# Patient Record
Sex: FEMALE | Race: OTHER | Employment: STUDENT | URBAN - METROPOLITAN AREA
[De-identification: names, ages, dates, MRNs, and addresses within clinical notes are randomized per-mention and may not be internally consistent; named-entity substitution may affect disease eponyms.]

---
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
## 2022-04-12 ENCOUNTER — OFFICE VISIT (OUTPATIENT)
Dept: FAMILY MEDICINE CLINIC | Facility: CLINIC | Age: 22
End: 2022-04-12
Payer: MEDICAID

## 2022-04-12 VITALS
WEIGHT: 222 LBS | BODY MASS INDEX: 36.99 KG/M2 | DIASTOLIC BLOOD PRESSURE: 81 MMHG | SYSTOLIC BLOOD PRESSURE: 135 MMHG | HEART RATE: 76 BPM | HEIGHT: 65 IN

## 2022-04-12 DIAGNOSIS — M25.562 CHRONIC PAIN OF LEFT KNEE: Primary | ICD-10-CM

## 2022-04-12 DIAGNOSIS — Z87.828 HISTORY OF TORN MENISCUS OF LEFT KNEE: ICD-10-CM

## 2022-04-12 DIAGNOSIS — L21.9 SEBORRHEIC DERMATITIS: ICD-10-CM

## 2022-04-12 DIAGNOSIS — G89.29 CHRONIC PAIN OF LEFT KNEE: Primary | ICD-10-CM

## 2022-04-12 DIAGNOSIS — L30.9 DERMATITIS: ICD-10-CM

## 2022-04-12 DIAGNOSIS — L70.9 ACNE, UNSPECIFIED ACNE TYPE: ICD-10-CM

## 2022-04-12 PROCEDURE — 99214 OFFICE O/P EST MOD 30 MIN: CPT | Performed by: NURSE PRACTITIONER

## 2022-04-12 PROCEDURE — 3008F BODY MASS INDEX DOCD: CPT | Performed by: NURSE PRACTITIONER

## 2022-04-12 PROCEDURE — 3075F SYST BP GE 130 - 139MM HG: CPT | Performed by: NURSE PRACTITIONER

## 2022-04-12 PROCEDURE — 3079F DIAST BP 80-89 MM HG: CPT | Performed by: NURSE PRACTITIONER

## 2022-04-12 RX ORDER — KETOCONAZOLE 20 MG/ML
10 SHAMPOO TOPICAL
Qty: 120 ML | Refills: 2 | Status: SHIPPED | OUTPATIENT
Start: 2022-04-14

## 2022-06-03 DIAGNOSIS — J30.2 SEASONAL ALLERGIES: ICD-10-CM

## 2022-06-03 RX ORDER — LEVOCETIRIZINE DIHYDROCHLORIDE 5 MG/1
TABLET, FILM COATED ORAL
Qty: 90 TABLET | Refills: 1 | Status: SHIPPED | OUTPATIENT
Start: 2022-06-03

## 2022-06-23 ENCOUNTER — OFFICE VISIT (OUTPATIENT)
Dept: DERMATOLOGY CLINIC | Facility: CLINIC | Age: 22
End: 2022-06-23
Payer: MEDICAID

## 2022-06-23 DIAGNOSIS — L70.0 ACNE VULGARIS: Primary | ICD-10-CM

## 2022-06-23 DIAGNOSIS — L91.0 KELOID SCAR: ICD-10-CM

## 2022-06-23 DIAGNOSIS — L40.0 PSORIASIS VULGARIS: ICD-10-CM

## 2022-06-23 PROCEDURE — 99204 OFFICE O/P NEW MOD 45 MIN: CPT | Performed by: DERMATOLOGY

## 2022-06-23 RX ORDER — CLINDAMYCIN PHOSPHATE 10 UG/ML
1 LOTION TOPICAL 2 TIMES DAILY
Qty: 60 ML | Refills: 6 | Status: SHIPPED | OUTPATIENT
Start: 2022-06-23

## 2022-06-23 RX ORDER — TRETINOIN 0.5 MG/G
CREAM TOPICAL
Qty: 20 G | Refills: 5 | Status: SHIPPED | OUTPATIENT
Start: 2022-06-23

## 2022-06-23 RX ORDER — CLOBETASOL PROPIONATE 0.5 MG/G
1 CREAM TOPICAL 2 TIMES DAILY
Qty: 60 G | Refills: 1 | Status: SHIPPED | OUTPATIENT
Start: 2022-06-23 | End: 2023-06-23

## 2022-07-28 ENCOUNTER — NURSE TRIAGE (OUTPATIENT)
Dept: FAMILY MEDICINE CLINIC | Facility: CLINIC | Age: 22
End: 2022-07-28

## 2022-08-02 ENCOUNTER — LAB ENCOUNTER (OUTPATIENT)
Dept: LAB | Age: 22
End: 2022-08-02
Attending: NURSE PRACTITIONER
Payer: MEDICAID

## 2022-08-02 ENCOUNTER — OFFICE VISIT (OUTPATIENT)
Dept: FAMILY MEDICINE CLINIC | Facility: CLINIC | Age: 22
End: 2022-08-02
Payer: MEDICAID

## 2022-08-02 VITALS
HEART RATE: 85 BPM | BODY MASS INDEX: 36.82 KG/M2 | WEIGHT: 221 LBS | DIASTOLIC BLOOD PRESSURE: 83 MMHG | HEIGHT: 65 IN | SYSTOLIC BLOOD PRESSURE: 135 MMHG

## 2022-08-02 DIAGNOSIS — Z83.3 FAMILY HISTORY OF DIABETES MELLITUS: ICD-10-CM

## 2022-08-02 DIAGNOSIS — Z83.3 FAMILY HISTORY OF DIABETES MELLITUS: Primary | ICD-10-CM

## 2022-08-02 LAB
BASOPHILS # BLD AUTO: 0.06 X10(3) UL (ref 0–0.2)
BASOPHILS NFR BLD AUTO: 0.6 %
DEPRECATED RDW RBC AUTO: 39.8 FL (ref 35.1–46.3)
EOSINOPHIL # BLD AUTO: 0.3 X10(3) UL (ref 0–0.7)
EOSINOPHIL NFR BLD AUTO: 3 %
ERYTHROCYTE [DISTWIDTH] IN BLOOD BY AUTOMATED COUNT: 13.2 % (ref 11–15)
EST. AVERAGE GLUCOSE BLD GHB EST-MCNC: 114 MG/DL (ref 68–126)
HBA1C MFR BLD: 5.6 % (ref ?–5.7)
HCT VFR BLD AUTO: 41.9 %
HGB BLD-MCNC: 13.2 G/DL
IMM GRANULOCYTES # BLD AUTO: 0.04 X10(3) UL (ref 0–1)
IMM GRANULOCYTES NFR BLD: 0.4 %
LYMPHOCYTES # BLD AUTO: 3.01 X10(3) UL (ref 1.5–5)
LYMPHOCYTES NFR BLD AUTO: 30.3 %
MCH RBC QN AUTO: 25.8 PG (ref 26–34)
MCHC RBC AUTO-ENTMCNC: 31.5 G/DL (ref 31–37)
MCV RBC AUTO: 81.8 FL
MONOCYTES # BLD AUTO: 0.85 X10(3) UL (ref 0.1–1)
MONOCYTES NFR BLD AUTO: 8.6 %
NEUTROPHILS # BLD AUTO: 5.67 X10 (3) UL (ref 1.5–7.7)
NEUTROPHILS # BLD AUTO: 5.67 X10(3) UL (ref 1.5–7.7)
NEUTROPHILS NFR BLD AUTO: 57.1 %
PLATELET # BLD AUTO: 446 10(3)UL (ref 150–450)
RBC # BLD AUTO: 5.12 X10(6)UL
WBC # BLD AUTO: 9.9 X10(3) UL (ref 4–11)

## 2022-08-02 PROCEDURE — 3008F BODY MASS INDEX DOCD: CPT | Performed by: NURSE PRACTITIONER

## 2022-08-02 PROCEDURE — 36415 COLL VENOUS BLD VENIPUNCTURE: CPT

## 2022-08-02 PROCEDURE — 85025 COMPLETE CBC W/AUTO DIFF WBC: CPT

## 2022-08-02 PROCEDURE — 83036 HEMOGLOBIN GLYCOSYLATED A1C: CPT

## 2022-08-02 PROCEDURE — 3079F DIAST BP 80-89 MM HG: CPT | Performed by: NURSE PRACTITIONER

## 2022-08-02 PROCEDURE — 99213 OFFICE O/P EST LOW 20 MIN: CPT | Performed by: NURSE PRACTITIONER

## 2022-08-02 PROCEDURE — 3075F SYST BP GE 130 - 139MM HG: CPT | Performed by: NURSE PRACTITIONER

## 2022-08-18 ENCOUNTER — OFFICE VISIT (OUTPATIENT)
Dept: DERMATOLOGY CLINIC | Facility: CLINIC | Age: 22
End: 2022-08-18
Payer: MEDICAID

## 2022-08-18 DIAGNOSIS — L91.0 KELOID SCAR: ICD-10-CM

## 2022-08-18 DIAGNOSIS — L40.0 PSORIASIS VULGARIS: ICD-10-CM

## 2022-08-18 DIAGNOSIS — L70.0 ACNE VULGARIS: Primary | ICD-10-CM

## 2022-08-18 PROCEDURE — 99213 OFFICE O/P EST LOW 20 MIN: CPT | Performed by: DERMATOLOGY

## 2022-08-18 RX ORDER — TRETINOIN 1 MG/G
1 CREAM TOPICAL NIGHTLY
Qty: 45 G | Refills: 3 | Status: SHIPPED | OUTPATIENT
Start: 2022-08-18 | End: 2022-09-17

## 2022-12-01 ENCOUNTER — OFFICE VISIT (OUTPATIENT)
Dept: DERMATOLOGY CLINIC | Facility: CLINIC | Age: 22
End: 2022-12-01
Payer: MEDICAID

## 2022-12-01 DIAGNOSIS — L40.0 PSORIASIS VULGARIS: ICD-10-CM

## 2022-12-01 DIAGNOSIS — L70.0 ACNE VULGARIS: Primary | ICD-10-CM

## 2022-12-01 DIAGNOSIS — L91.0 KELOID SCAR: ICD-10-CM

## 2022-12-01 PROCEDURE — 99213 OFFICE O/P EST LOW 20 MIN: CPT | Performed by: DERMATOLOGY

## 2022-12-01 RX ORDER — MOMETASONE FUROATE 1 MG/ML
SOLUTION TOPICAL
Qty: 60 ML | Refills: 3 | Status: SHIPPED | OUTPATIENT
Start: 2022-12-01

## 2023-04-06 ENCOUNTER — OFFICE VISIT (OUTPATIENT)
Dept: DERMATOLOGY CLINIC | Facility: CLINIC | Age: 23
End: 2023-04-06

## 2023-04-06 DIAGNOSIS — L70.0 ACNE VULGARIS: Primary | ICD-10-CM

## 2023-04-06 DIAGNOSIS — L91.0 KELOID SCAR: ICD-10-CM

## 2023-04-06 DIAGNOSIS — L40.0 PSORIASIS VULGARIS: ICD-10-CM

## 2023-04-06 DIAGNOSIS — L50.3 DERMATOGRAPHISM: ICD-10-CM

## 2023-04-06 PROCEDURE — 99214 OFFICE O/P EST MOD 30 MIN: CPT | Performed by: DERMATOLOGY

## 2023-04-06 RX ORDER — TRETINOIN 0.5 MG/G
CREAM TOPICAL
Qty: 20 G | Refills: 6 | Status: SHIPPED | OUTPATIENT
Start: 2023-04-06

## 2023-04-06 RX ORDER — MOMETASONE FUROATE 1 MG/ML
SOLUTION TOPICAL
Qty: 60 ML | Refills: 6 | Status: SHIPPED | OUTPATIENT
Start: 2023-04-06

## 2023-07-06 ENCOUNTER — LAB ENCOUNTER (OUTPATIENT)
Dept: LAB | Age: 23
End: 2023-07-06
Attending: DERMATOLOGY
Payer: MEDICAID

## 2023-07-06 ENCOUNTER — OFFICE VISIT (OUTPATIENT)
Dept: DERMATOLOGY CLINIC | Facility: CLINIC | Age: 23
End: 2023-07-06

## 2023-07-06 DIAGNOSIS — L40.0 PSORIASIS VULGARIS: ICD-10-CM

## 2023-07-06 DIAGNOSIS — L70.0 ACNE VULGARIS: ICD-10-CM

## 2023-07-06 DIAGNOSIS — N92.6 ABNORMAL MENSES: ICD-10-CM

## 2023-07-06 DIAGNOSIS — L91.0 KELOID SCAR: ICD-10-CM

## 2023-07-06 DIAGNOSIS — L70.0 ACNE VULGARIS: Primary | ICD-10-CM

## 2023-07-06 DIAGNOSIS — L50.3 DERMATOGRAPHISM: ICD-10-CM

## 2023-07-06 LAB
ALBUMIN SERPL-MCNC: 3.8 G/DL (ref 3.4–5)
ALBUMIN/GLOB SERPL: 1 {RATIO} (ref 1–2)
ALP LIVER SERPL-CCNC: 85 U/L
ALT SERPL-CCNC: 29 U/L
ANION GAP SERPL CALC-SCNC: 6 MMOL/L (ref 0–18)
AST SERPL-CCNC: 13 U/L (ref 15–37)
BILIRUB SERPL-MCNC: 0.2 MG/DL (ref 0.1–2)
BUN BLD-MCNC: 12 MG/DL (ref 7–18)
CALCIUM BLD-MCNC: 9.5 MG/DL (ref 8.5–10.1)
CHLORIDE SERPL-SCNC: 105 MMOL/L (ref 98–112)
CO2 SERPL-SCNC: 27 MMOL/L (ref 21–32)
CREAT BLD-MCNC: 0.76 MG/DL
DHEA-S SERPL-MCNC: 367.7 UG/DL
EST. AVERAGE GLUCOSE BLD GHB EST-MCNC: 123 MG/DL (ref 68–126)
FASTING STATUS PATIENT QL REPORTED: NO
GFR SERPLBLD BASED ON 1.73 SQ M-ARVRAT: 115 ML/MIN/1.73M2 (ref 60–?)
GLOBULIN PLAS-MCNC: 3.9 G/DL (ref 2.8–4.4)
GLUCOSE BLD-MCNC: 94 MG/DL (ref 70–99)
HBA1C MFR BLD: 5.9 % (ref ?–5.7)
INSULIN SERPL-ACNC: 55 MU/L (ref 3–25)
OSMOLALITY SERPL CALC.SUM OF ELEC: 286 MOSM/KG (ref 275–295)
POTASSIUM SERPL-SCNC: 3.7 MMOL/L (ref 3.5–5.1)
PROLACTIN SERPL-MCNC: 8.3 NG/ML
PROT SERPL-MCNC: 7.7 G/DL (ref 6.4–8.2)
SODIUM SERPL-SCNC: 138 MMOL/L (ref 136–145)
TSI SER-ACNC: 1.34 MIU/ML (ref 0.36–3.74)

## 2023-07-06 PROCEDURE — 83036 HEMOGLOBIN GLYCOSYLATED A1C: CPT

## 2023-07-06 PROCEDURE — 84443 ASSAY THYROID STIM HORMONE: CPT

## 2023-07-06 PROCEDURE — 36415 COLL VENOUS BLD VENIPUNCTURE: CPT

## 2023-07-06 PROCEDURE — 80053 COMPREHEN METABOLIC PANEL: CPT

## 2023-07-06 PROCEDURE — 82627 DEHYDROEPIANDROSTERONE: CPT

## 2023-07-06 PROCEDURE — 83525 ASSAY OF INSULIN: CPT

## 2023-07-06 PROCEDURE — 84146 ASSAY OF PROLACTIN: CPT

## 2023-07-06 PROCEDURE — 84410 TESTOSTERONE BIOAVAILABLE: CPT

## 2023-07-06 PROCEDURE — 99214 OFFICE O/P EST MOD 30 MIN: CPT | Performed by: DERMATOLOGY

## 2023-07-06 RX ORDER — MINOCYCLINE HYDROCHLORIDE 50 MG/1
50 CAPSULE ORAL DAILY
Qty: 30 CAPSULE | Refills: 3 | Status: SHIPPED | OUTPATIENT
Start: 2023-07-06

## 2023-07-10 ENCOUNTER — TELEPHONE (OUTPATIENT)
Dept: DERMATOLOGY CLINIC | Facility: CLINIC | Age: 23
End: 2023-07-10

## 2023-07-10 NOTE — PROGRESS NOTES
Pt informed of lab results and recommendations and became tearful. Pt states she has a f/u appt with PCP soon and has an appt in 2 months with Jack Son. Await testosterone results.

## 2023-07-12 LAB
SEX HORM BIND GLOB: 26.5 NMOL/L
TESTOST % FREE+WEAK BND: 29.2 %
TESTOST FREE+WEAK BND: 10.9 NG/DL
TESTOSTERONE TOT /MS: 37.4 NG/DL

## 2023-07-16 NOTE — PROGRESS NOTES
Pasquale Lua is a 23year old female. Patient presents with:  Acne: LOV 4/2023. Pt presents for acne f/u. RX Tretinoin 0.05 % External Cream and clindamycin 1 % External Lotion. Pt feels overall working okay. Has decreased her tretinoin r/t tanning. Still having flares to the left side jawline. Psoriasis: Rx Mometasone Furoate 0.1 % External Solution, ketoconazole 2 % External Shampoo, and clobetasol. Pt feels overall working well. Patient has no known allergies. Current Outpatient Medications   Medication Sig Dispense Refill    metFORMIN 500 MG Oral Tab Take 1 tablet (500 mg total) by mouth 2 (two) times daily with meals. 60 tablet 3    minocycline 50 MG Oral Cap Take 1 capsule (50 mg total) by mouth daily. 30 capsule 3    buPROPion  MG Oral Tablet 24 Hr Take 1 tablet (150 mg total) by mouth daily. 30 tablet 1    Tretinoin 0.05 % External Cream Apply a small pea sized amount to areas of acne on the face as directed at bedtime. Use over serums, clindamycin and moistruizers 20 g 6    Mometasone Furoate 0.1 % External Solution Use bid to scalp 60 mL 6    clindamycin 1 % External Lotion Apply 1 Application topically 2 (two) times daily. Apply thin film to affected area(s). For acne 60 mL 6    LEVOCETIRIZINE 5 MG Oral Tab TAKE 1 TABLET(5 MG) BY MOUTH EVERY EVENING 90 tablet 1    ketoconazole 2 % External Shampoo Apply 10 mL topically twice a week. 120 mL 2      History reviewed. No pertinent past medical history.    Social History:  Social History     Socioeconomic History    Marital status: Single   Tobacco Use    Smoking status: Never    Smokeless tobacco: Never   Vaping Use    Vaping Use: Never used   Substance and Sexual Activity    Alcohol use: Not Currently    Drug use: Never    Sexual activity: Never     Birth control/protection: Pill, OCP   Other Topics Concern    Grew up on a farm No    History of tanning Yes    Outdoor occupation No    Breast feeding No    Reaction to local anesthetic No    Pt has a pacemaker No    Pt has a defibrillator No                 Current Outpatient Medications   Medication Sig Dispense Refill    metFORMIN 500 MG Oral Tab Take 1 tablet (500 mg total) by mouth 2 (two) times daily with meals. 60 tablet 3    minocycline 50 MG Oral Cap Take 1 capsule (50 mg total) by mouth daily. 30 capsule 3    buPROPion  MG Oral Tablet 24 Hr Take 1 tablet (150 mg total) by mouth daily. 30 tablet 1    Tretinoin 0.05 % External Cream Apply a small pea sized amount to areas of acne on the face as directed at bedtime. Use over serums, clindamycin and moistruizers 20 g 6    Mometasone Furoate 0.1 % External Solution Use bid to scalp 60 mL 6    clindamycin 1 % External Lotion Apply 1 Application topically 2 (two) times daily. Apply thin film to affected area(s). For acne 60 mL 6    LEVOCETIRIZINE 5 MG Oral Tab TAKE 1 TABLET(5 MG) BY MOUTH EVERY EVENING 90 tablet 1    ketoconazole 2 % External Shampoo Apply 10 mL topically twice a week. 120 mL 2     Allergies:   No Known Allergies    History reviewed. No pertinent past medical history. History reviewed. No pertinent surgical history.   Social History    Socioeconomic History      Marital status: Single      Spouse name: Not on file      Number of children: Not on file      Years of education: Not on file      Highest education level: Not on file    Occupational History      Not on file    Tobacco Use      Smoking status: Never      Smokeless tobacco: Never    Vaping Use      Vaping Use: Never used    Substance and Sexual Activity      Alcohol use: Not Currently      Drug use: Never      Sexual activity: Never        Birth control/protection: Pill, OCP    Other Topics      Concerns:        Grew up on a farm: No        History of tanning: Yes        Outdoor occupation: No        Breast feeding: No        Reaction to local anesthetic: No        Pt has a pacemaker: No        Pt has a defibrillator: No    Social History Narrative Not on file    Social Determinants of Health  Financial Resource Strain: Not on file  Food Insecurity: Not on file  Transportation Needs: Not on file  Physical Activity: Not on file  Stress: Not on file  Social Connections: Not on file  Housing Stability: Not on file  Family History   Problem Relation Age of Onset    Diabetes Mother     Cancer Maternal Grandmother         ovarian                      HPI :      Patient presents with:  Acne: LOV 4/2023. Pt presents for acne f/u. RX Tretinoin 0.05 % External Cream and clindamycin 1 % External Lotion. Pt feels overall working okay. Has decreased her tretinoin r/t tanning. Still having flares to the left side jawline. Psoriasis: Rx Mometasone Furoate 0.1 % External Solution, ketoconazole 2 % External Shampoo, and clobetasol. Pt feels overall working well. Follow-up acne flaring on face especially left more nodules frustrated, topicals help not controlled completely    Psoriasis better  Keloid stable improving    Itching stable  Patient presents with concerns above. Past notes/ records and appropriate/relevant lab results including pathology and past body maps reviewed. Updated and new information noted in current visit. ROS:    Denies any other systemic complaints. No fevers, chills, night sweats, sensitivity to the sun, deeper lumps or bumps. No other skin complaints. History, medications, allergies as noted. Physical examination: Patient  well-developed well-nourished, alert oriented in no acute distress. Exam of involved, appropriate areas of skin performed, including scalp, head, neck, face,nails, hair, external eyes, including conjunctival mucosa, eyelids, lips, external ears, back, chest, abdomen, arms, legs, palms. Remarkable for lesions as noted     Acne cystic nodules grade 3 chin cheeks jawline, postinflammatory changes.   Chest back shoulders okay, psoriasis stable improved to scalp fairly clear, minimal knees elbows  ASSESSMENT AND PLAN:     Acne vulgaris  (primary encounter diagnosis)  Abnormal menses    Assessment / plan:    Acne. See medications in grid. Skin care regimen discussed at length including cleansers, makeup, face washing, sunscreen. Recheck in 6 -8 weeks if no improvement. Notify us promptly if problems tolerating regimen. Consider more aggressive therapy if not responding. Patient cycles still irregular no menses since 4/23 we will check labs as noted,  Consider additional medications including spironolactone patient agreeable to starting oral medications at this time frustrated with nodules. Continue topicals currently current skin care regimen tretinoin, clindamycin    Keloid stable improving    Psoriasis fairly stable with ketoconazole shampoo, mometasone solution has been doing fairly well has clobetasol cream for elbows and knees    Itching okay continue antihistamines as needed for dermographism      Pathophysiology discussed with patient. Therapeutic options reviewed. See  Medications in grid. Instructions reviewed at length. General skin care questions answered. Reassurance regarding benign skin lesions. Signs and symptoms of skin cancer, ABCDE's of melanoma briefly reviewed. Sunscreen use, sun protection, encouraged. Followup as noted in rtc or p.r.n. Encounter Times  Including precharting, reviewing chart, prior notes obtaining history: 5 minutes, medical exam :10 minutes, notes on body map, plan, counseling 10minutes My total time spent caring for the patient on the day of the encounter: 25 minutes       The patient indicates understanding of these issues and agrees to the plan. The patient is asked to return as noted in follow-up /as noted above    This note was generated using Dragon voice recognition software. Please contact me regarding any confusion resulting from errors in recognition.     Note to patient and family: The [facility] makes medical notes like these available to patients. However, be advised this is a medical document. It is intended as hgaf-eq-rale communication and monitoring of a patient's care needs. It is written in medical language and may contain abbreviations or verbiage that are unfamiliar. It may appear blunt or direct. Medical documents are intended to carry relevant information, facts as evident and the clinical opinion of the practitioner. Orders Placed This Encounter      Dehydroepiandrosterone Sulfate      Testosterone,Total and Weakly Bound w/ SHBG      Hemoglobin A1C      TSH W Reflex To Free T4      Comp Metabolic Panel (14)      Prolactin      Insulin      Meds & Refills for this Visit:   Requested Prescriptions     Signed Prescriptions Disp Refills    minocycline 50 MG Oral Cap 30 capsule 3     Sig: Take 1 capsule (50 mg total) by mouth daily. metFORMIN 500 MG Oral Tab 60 tablet 3     Sig: Take 1 tablet (500 mg total) by mouth 2 (two) times daily with meals. Acne vulgaris  (primary encounter diagnosis)  Abnormal menses    Orders Placed This Encounter      Dehydroepiandrosterone Sulfate      Testosterone,Total and Weakly Bound w/ SHBG      Hemoglobin A1C      TSH W Reflex To Free T4      Comp Metabolic Panel (14)      Prolactin      Insulin      Results From Past 48 Hours:  No results found for this or any previous visit (from the past 48 hour(s)). Meds This Visit:      Imaging Orders:  None     Referral Orders:  No orders of the defined types were placed in this encounter.

## 2023-08-21 ENCOUNTER — OFFICE VISIT (OUTPATIENT)
Dept: FAMILY MEDICINE CLINIC | Facility: CLINIC | Age: 23
End: 2023-08-21

## 2023-08-21 VITALS
SYSTOLIC BLOOD PRESSURE: 129 MMHG | TEMPERATURE: 99 F | DIASTOLIC BLOOD PRESSURE: 84 MMHG | WEIGHT: 246 LBS | BODY MASS INDEX: 41 KG/M2 | HEART RATE: 111 BPM

## 2023-08-21 DIAGNOSIS — L21.9 SEBORRHEIC DERMATITIS: ICD-10-CM

## 2023-08-21 DIAGNOSIS — R63.2 BINGE EATING: ICD-10-CM

## 2023-08-21 DIAGNOSIS — R41.840 INATTENTION: Primary | ICD-10-CM

## 2023-08-21 DIAGNOSIS — F41.8 DEPRESSION WITH ANXIETY: ICD-10-CM

## 2023-08-21 DIAGNOSIS — L70.9 ACNE, UNSPECIFIED ACNE TYPE: ICD-10-CM

## 2023-08-21 PROCEDURE — 99214 OFFICE O/P EST MOD 30 MIN: CPT | Performed by: FAMILY MEDICINE

## 2023-08-21 PROCEDURE — 3079F DIAST BP 80-89 MM HG: CPT | Performed by: FAMILY MEDICINE

## 2023-08-21 PROCEDURE — 3074F SYST BP LT 130 MM HG: CPT | Performed by: FAMILY MEDICINE

## 2023-08-21 RX ORDER — BUPROPION HYDROCHLORIDE 150 MG/1
150 TABLET ORAL 2 TIMES DAILY
Qty: 180 TABLET | Refills: 0 | Status: SHIPPED | OUTPATIENT
Start: 2023-08-21 | End: 2023-08-23

## 2023-08-21 RX ORDER — TRETINOIN 0.5 MG/G
CREAM TOPICAL
Qty: 20 G | Refills: 6 | Status: SHIPPED | OUTPATIENT
Start: 2023-08-21

## 2023-08-21 RX ORDER — KETOCONAZOLE 20 MG/ML
10 SHAMPOO TOPICAL
Qty: 120 ML | Refills: 2 | Status: SHIPPED | OUTPATIENT
Start: 2023-08-21

## 2023-08-22 ENCOUNTER — TELEPHONE (OUTPATIENT)
Dept: FAMILY MEDICINE CLINIC | Facility: CLINIC | Age: 23
End: 2023-08-22

## 2023-08-22 DIAGNOSIS — F41.8 DEPRESSION WITH ANXIETY: ICD-10-CM

## 2023-08-22 NOTE — TELEPHONE ENCOUNTER
Vyvanse has been denied, medication requested as ADHD meds per office visit reason for visit, if this is for binge eating you may submit an appeal , see page 2

## 2023-08-23 PROBLEM — F41.8 DEPRESSION WITH ANXIETY: Status: ACTIVE | Noted: 2023-08-23

## 2023-08-23 PROBLEM — R63.2 BINGE EATING: Status: ACTIVE | Noted: 2023-08-23

## 2023-08-23 PROBLEM — L70.9 ACNE: Status: ACTIVE | Noted: 2023-08-23

## 2023-08-23 PROBLEM — R41.840 INATTENTION: Status: ACTIVE | Noted: 2023-08-23

## 2023-08-23 PROBLEM — L40.9 PSORIASIS: Status: ACTIVE | Noted: 2023-08-23

## 2023-08-23 PROBLEM — L21.9 SEBORRHEIC DERMATITIS: Status: ACTIVE | Noted: 2023-08-23

## 2023-08-23 RX ORDER — BUPROPION HYDROCHLORIDE 300 MG/1
300 TABLET ORAL 2 TIMES DAILY
Qty: 90 TABLET | Refills: 1 | Status: SHIPPED | OUTPATIENT
Start: 2023-08-23

## 2023-08-23 RX ORDER — LISDEXAMFETAMINE DIMESYLATE 10 MG/1
10 CAPSULE ORAL DAILY
Qty: 30 CAPSULE | Refills: 0 | Status: SHIPPED | OUTPATIENT
Start: 2023-08-23 | End: 2023-09-22

## 2023-08-23 NOTE — TELEPHONE ENCOUNTER
Sending Vyvanse at a lower dose and linked only with binge eating diagnosis. She does not have any documented ADD/ADHD.   Thank you

## 2023-08-24 NOTE — TELEPHONE ENCOUNTER
Approved    Prior authorization approved   Case ID: [id number]      Payer: [address] [place] [address] Road    [phone]   The case has been Approved from  [id number]   Approval Details    Authorized from May 25, 2023 to August 23, 2024      Electronic appeal: Not supported   View History

## 2023-09-07 ENCOUNTER — OFFICE VISIT (OUTPATIENT)
Dept: DERMATOLOGY CLINIC | Facility: CLINIC | Age: 23
End: 2023-09-07

## 2023-09-07 DIAGNOSIS — N92.6 ABNORMAL MENSES: ICD-10-CM

## 2023-09-07 DIAGNOSIS — L40.0 PSORIASIS VULGARIS: ICD-10-CM

## 2023-09-07 DIAGNOSIS — L50.3 DERMATOGRAPHISM: ICD-10-CM

## 2023-09-07 DIAGNOSIS — L91.0 KELOID SCAR: ICD-10-CM

## 2023-09-07 DIAGNOSIS — L70.0 ACNE VULGARIS: Primary | ICD-10-CM

## 2023-09-07 PROCEDURE — 99214 OFFICE O/P EST MOD 30 MIN: CPT | Performed by: DERMATOLOGY

## 2023-09-07 RX ORDER — HYDROCORTISONE 25 MG/ML
LOTION TOPICAL
Qty: 120 ML | Refills: 5 | Status: SHIPPED | OUTPATIENT
Start: 2023-09-07

## 2023-09-07 RX ORDER — SPIRONOLACTONE 50 MG/1
100 TABLET, FILM COATED ORAL DAILY
Qty: 60 TABLET | Refills: 3 | Status: SHIPPED | OUTPATIENT
Start: 2023-09-07

## 2023-10-02 ENCOUNTER — OFFICE VISIT (OUTPATIENT)
Dept: FAMILY MEDICINE CLINIC | Facility: CLINIC | Age: 23
End: 2023-10-02

## 2023-10-02 VITALS
BODY MASS INDEX: 39 KG/M2 | SYSTOLIC BLOOD PRESSURE: 124 MMHG | WEIGHT: 237 LBS | HEART RATE: 74 BPM | DIASTOLIC BLOOD PRESSURE: 87 MMHG | TEMPERATURE: 98 F

## 2023-10-02 DIAGNOSIS — L70.9 ACNE, UNSPECIFIED ACNE TYPE: Primary | ICD-10-CM

## 2023-10-02 DIAGNOSIS — F41.8 DEPRESSION WITH ANXIETY: ICD-10-CM

## 2023-10-02 DIAGNOSIS — R63.2 BINGE EATING: ICD-10-CM

## 2023-10-02 PROCEDURE — 3079F DIAST BP 80-89 MM HG: CPT | Performed by: FAMILY MEDICINE

## 2023-10-02 PROCEDURE — 3074F SYST BP LT 130 MM HG: CPT | Performed by: FAMILY MEDICINE

## 2023-10-02 PROCEDURE — 99214 OFFICE O/P EST MOD 30 MIN: CPT | Performed by: FAMILY MEDICINE

## 2023-10-02 RX ORDER — BUPROPION HYDROCHLORIDE 300 MG/1
300 TABLET ORAL 2 TIMES DAILY
Qty: 90 TABLET | Refills: 1 | Status: SHIPPED | OUTPATIENT
Start: 2023-10-02

## 2023-10-02 RX ORDER — LISDEXAMFETAMINE DIMESYLATE CAPSULES 30 MG/1
30 CAPSULE ORAL DAILY
Qty: 30 CAPSULE | Refills: 0 | Status: SHIPPED | OUTPATIENT
Start: 2023-11-02 | End: 2023-12-02

## 2023-10-02 RX ORDER — NORETHINDRONE ACETATE AND ETHINYL ESTRADIOL 1MG-20(21)
1 KIT ORAL DAILY
COMMUNITY

## 2023-10-02 RX ORDER — LISDEXAMFETAMINE DIMESYLATE CAPSULES 10 MG/1
10 CAPSULE ORAL EVERY MORNING
COMMUNITY

## 2023-10-02 RX ORDER — CLINDAMYCIN PHOSPHATE 10 UG/ML
1 LOTION TOPICAL 2 TIMES DAILY
Qty: 60 ML | Refills: 6 | Status: SHIPPED | OUTPATIENT
Start: 2023-10-02

## 2023-10-02 RX ORDER — LISDEXAMFETAMINE DIMESYLATE CAPSULES 30 MG/1
30 CAPSULE ORAL DAILY
Qty: 30 CAPSULE | Refills: 0 | Status: SHIPPED | OUTPATIENT
Start: 2023-10-02 | End: 2023-11-01

## 2023-10-02 RX ORDER — LISDEXAMFETAMINE DIMESYLATE CAPSULES 30 MG/1
30 CAPSULE ORAL DAILY
Qty: 30 CAPSULE | Refills: 0 | Status: SHIPPED | OUTPATIENT
Start: 2023-12-03 | End: 2024-01-02

## 2023-10-03 ENCOUNTER — TELEPHONE (OUTPATIENT)
Dept: FAMILY MEDICINE CLINIC | Facility: CLINIC | Age: 23
End: 2023-10-03

## 2023-10-12 ENCOUNTER — TELEPHONE (OUTPATIENT)
Dept: FAMILY MEDICINE CLINIC | Facility: CLINIC | Age: 23
End: 2023-10-12

## 2023-10-12 DIAGNOSIS — F41.8 DEPRESSION WITH ANXIETY: ICD-10-CM

## 2023-10-18 ENCOUNTER — OFFICE VISIT (OUTPATIENT)
Dept: ENDOCRINOLOGY CLINIC | Facility: CLINIC | Age: 23
End: 2023-10-18

## 2023-10-18 VITALS
SYSTOLIC BLOOD PRESSURE: 147 MMHG | BODY MASS INDEX: 39.82 KG/M2 | DIASTOLIC BLOOD PRESSURE: 94 MMHG | HEIGHT: 65 IN | HEART RATE: 98 BPM | WEIGHT: 239 LBS

## 2023-10-18 DIAGNOSIS — E28.2 PCOS (POLYCYSTIC OVARIAN SYNDROME): ICD-10-CM

## 2023-10-18 DIAGNOSIS — R73.03 PRE-DIABETES: ICD-10-CM

## 2023-10-18 DIAGNOSIS — E88.819 INSULIN RESISTANCE: Primary | ICD-10-CM

## 2023-10-18 PROCEDURE — 3077F SYST BP >= 140 MM HG: CPT | Performed by: INTERNAL MEDICINE

## 2023-10-18 PROCEDURE — 99244 OFF/OP CNSLTJ NEW/EST MOD 40: CPT | Performed by: INTERNAL MEDICINE

## 2023-10-18 PROCEDURE — 3080F DIAST BP >= 90 MM HG: CPT | Performed by: INTERNAL MEDICINE

## 2023-10-18 PROCEDURE — 3008F BODY MASS INDEX DOCD: CPT | Performed by: INTERNAL MEDICINE

## 2023-10-18 RX ORDER — BUPROPION HYDROCHLORIDE 300 MG/1
300 TABLET ORAL 2 TIMES DAILY
Qty: 60 TABLET | Refills: 5 | Status: SHIPPED | OUTPATIENT
Start: 2023-10-18 | End: 2023-11-14

## 2023-10-18 NOTE — TELEPHONE ENCOUNTER
1. Depression with anxiety  \"  Rx benefits calling to verify QTY, advised 60 per 30 days    \"  - buPROPion  MG Oral Tablet 24 Hr; Take 1 tablet (300 mg total) by mouth in the morning and 1 tablet (300 mg total) before bedtime.  Dispense: 60 tablet; Refill: 5

## 2023-10-25 ENCOUNTER — LAB ENCOUNTER (OUTPATIENT)
Dept: LAB | Age: 23
End: 2023-10-25
Attending: INTERNAL MEDICINE

## 2023-10-25 DIAGNOSIS — R73.03 PRE-DIABETES: ICD-10-CM

## 2023-10-25 DIAGNOSIS — E88.819 INSULIN RESISTANCE: ICD-10-CM

## 2023-10-25 DIAGNOSIS — E28.2 PCOS (POLYCYSTIC OVARIAN SYNDROME): ICD-10-CM

## 2023-10-25 LAB
ALBUMIN SERPL-MCNC: 3.4 G/DL (ref 3.4–5)
ALBUMIN/GLOB SERPL: 0.8 {RATIO} (ref 1–2)
ALP LIVER SERPL-CCNC: 84 U/L
ALT SERPL-CCNC: 31 U/L
ANION GAP SERPL CALC-SCNC: 8 MMOL/L (ref 0–18)
AST SERPL-CCNC: 9 U/L (ref 15–37)
BILIRUB SERPL-MCNC: 0.3 MG/DL (ref 0.1–2)
BUN BLD-MCNC: 9 MG/DL (ref 7–18)
BUN/CREAT SERPL: 10.2 (ref 10–20)
CALCIUM BLD-MCNC: 9.4 MG/DL (ref 8.5–10.1)
CHLORIDE SERPL-SCNC: 106 MMOL/L (ref 98–112)
CHOLEST SERPL-MCNC: 198 MG/DL (ref ?–200)
CO2 SERPL-SCNC: 24 MMOL/L (ref 21–32)
CREAT BLD-MCNC: 0.88 MG/DL
EGFRCR SERPLBLD CKD-EPI 2021: 96 ML/MIN/1.73M2 (ref 60–?)
EST. AVERAGE GLUCOSE BLD GHB EST-MCNC: 114 MG/DL (ref 68–126)
FASTING PATIENT LIPID ANSWER: YES
FASTING STATUS PATIENT QL REPORTED: YES
GLOBULIN PLAS-MCNC: 4.5 G/DL (ref 2.8–4.4)
GLUCOSE BLD-MCNC: 114 MG/DL (ref 70–99)
HBA1C MFR BLD: 5.6 % (ref ?–5.7)
HDLC SERPL-MCNC: 85 MG/DL (ref 40–59)
INSULIN SERPL-ACNC: 39.3 MU/L (ref 3–25)
LDLC SERPL CALC-MCNC: 81 MG/DL (ref ?–100)
NONHDLC SERPL-MCNC: 113 MG/DL (ref ?–130)
OSMOLALITY SERPL CALC.SUM OF ELEC: 286 MOSM/KG (ref 275–295)
POTASSIUM SERPL-SCNC: 4.3 MMOL/L (ref 3.5–5.1)
PROT SERPL-MCNC: 7.9 G/DL (ref 6.4–8.2)
SODIUM SERPL-SCNC: 138 MMOL/L (ref 136–145)
TRIGL SERPL-MCNC: 194 MG/DL (ref 30–149)
VLDLC SERPL CALC-MCNC: 31 MG/DL (ref 0–30)

## 2023-10-25 PROCEDURE — 36415 COLL VENOUS BLD VENIPUNCTURE: CPT

## 2023-10-25 PROCEDURE — 83525 ASSAY OF INSULIN: CPT

## 2023-10-25 PROCEDURE — 80053 COMPREHEN METABOLIC PANEL: CPT

## 2023-10-25 PROCEDURE — 83036 HEMOGLOBIN GLYCOSYLATED A1C: CPT

## 2023-10-25 PROCEDURE — 80061 LIPID PANEL: CPT

## 2023-11-03 RX ORDER — MINOCYCLINE HYDROCHLORIDE 50 MG/1
50 CAPSULE ORAL DAILY
Qty: 30 CAPSULE | Refills: 3 | Status: SHIPPED | OUTPATIENT
Start: 2023-11-03

## 2023-11-03 NOTE — TELEPHONE ENCOUNTER
Refill Request for medication(s): minocycline 50 MG Oral Cap     Last Office Visit: 9/2023    Last Refill: 9/2023    Pharmacy, Dosage verified:     Condition Update (if applicable):     Rx pended and sent to provider for approval, please advise. Thank You!

## 2023-11-13 ENCOUNTER — TELEPHONE (OUTPATIENT)
Dept: INTERNAL MEDICINE CLINIC | Facility: CLINIC | Age: 23
End: 2023-11-13

## 2023-11-13 DIAGNOSIS — F41.8 DEPRESSION WITH ANXIETY: ICD-10-CM

## 2023-11-13 NOTE — TELEPHONE ENCOUNTER
Prior Authorization    Bupropion XL 300MG TABLETS     Plan does not cover this medication. Please call plan at [phone] to initiate prior authorization or call/fax pharmacy to change medication.  Patient ID is [id number]

## 2023-11-13 NOTE — TELEPHONE ENCOUNTER
Denied   10/12/2023  5:00 PM  Case ID: [id number] Appeal supported: No   Note from payer: Details of this decision are provided on the physician outcome notice which has been faxed to the number on file. Payer: [address] [place] [address] Road    [phone]     See encounter 10/12/23    Alcario Comas is 30/30 per denial letter   The dose must be at or below the highest dose that is noted by the FDA. Please advise the next steps.

## 2023-11-14 ENCOUNTER — OFFICE VISIT (OUTPATIENT)
Dept: ENDOCRINOLOGY CLINIC | Facility: CLINIC | Age: 23
End: 2023-11-14

## 2023-11-14 VITALS
BODY MASS INDEX: 39.82 KG/M2 | DIASTOLIC BLOOD PRESSURE: 84 MMHG | HEIGHT: 65 IN | WEIGHT: 239 LBS | SYSTOLIC BLOOD PRESSURE: 139 MMHG | HEART RATE: 99 BPM

## 2023-11-14 DIAGNOSIS — R73.03 PRE-DIABETES: ICD-10-CM

## 2023-11-14 DIAGNOSIS — E88.819 INSULIN RESISTANCE: Primary | ICD-10-CM

## 2023-11-14 DIAGNOSIS — E28.2 PCOS (POLYCYSTIC OVARIAN SYNDROME): ICD-10-CM

## 2023-11-14 PROCEDURE — 99214 OFFICE O/P EST MOD 30 MIN: CPT | Performed by: INTERNAL MEDICINE

## 2023-11-14 PROCEDURE — 3079F DIAST BP 80-89 MM HG: CPT | Performed by: INTERNAL MEDICINE

## 2023-11-14 PROCEDURE — 3075F SYST BP GE 130 - 139MM HG: CPT | Performed by: INTERNAL MEDICINE

## 2023-11-14 PROCEDURE — 3008F BODY MASS INDEX DOCD: CPT | Performed by: INTERNAL MEDICINE

## 2023-11-14 RX ORDER — BUPROPION HYDROCHLORIDE 300 MG/1
300 TABLET ORAL DAILY
Qty: 30 TABLET | Refills: 3 | Status: SHIPPED | OUTPATIENT
Start: 2023-11-14 | End: 2023-12-14

## 2023-11-14 NOTE — TELEPHONE ENCOUNTER
1. Depression with anxiety  Sending 30/30 if that's going to get covered. - buPROPion  MG Oral Tablet 24 Hr; Take 1 tablet (300 mg total) by mouth daily. Dispense: 30 tablet;  Refill: 3

## 2023-11-15 ENCOUNTER — TELEPHONE (OUTPATIENT)
Dept: INTERNAL MEDICINE CLINIC | Facility: CLINIC | Age: 23
End: 2023-11-15

## 2023-11-15 NOTE — TELEPHONE ENCOUNTER
Patient asking if she can skip dose of Bupropion 300 mg. Patient will be celebrating 23st birthday. Please advise.

## 2023-11-15 NOTE — TELEPHONE ENCOUNTER
Patient asking if she will be able to drink alcohol while taking rx Bupropion, patient will be turning 23. Patient asking if she would be able to skip a dose, please update through jaswinder, atilio.

## 2023-11-19 NOTE — TELEPHONE ENCOUNTER
Wellbutrin will still be in her system. Wouldn't skip just wouldn't drink more than 1-2 servings if she has to on this one day.  Absolutely no binge drinking

## 2023-11-20 NOTE — TELEPHONE ENCOUNTER
Spoke with patient (name/ confirmed) and informed patient of Dr. Alexia Merlos instructions/recommendations. Patient verbalizes understanding and agrees to the plan of care.   Patient had no further questions at this time    Patient states did also ask the pharmacist about side effects    Confirmed with patient that Dr. Yasmani Lambert is her PCP   Order to change entered

## 2023-11-21 ENCOUNTER — PATIENT OUTREACH (OUTPATIENT)
Dept: CASE MANAGEMENT | Age: 23
End: 2023-11-21

## 2023-11-21 NOTE — PROCEDURES
Received order requesting to update PCP to Dr. Varun Penaloza is Approved and finalized on November 21, 2023.     Thanks,  [facility]

## 2023-11-27 ENCOUNTER — OFFICE VISIT (OUTPATIENT)
Dept: SURGERY | Facility: CLINIC | Age: 23
End: 2023-11-27
Payer: MEDICAID

## 2023-11-27 VITALS
OXYGEN SATURATION: 99 % | BODY MASS INDEX: 39.95 KG/M2 | HEIGHT: 64.2 IN | WEIGHT: 234 LBS | DIASTOLIC BLOOD PRESSURE: 80 MMHG | HEART RATE: 66 BPM | SYSTOLIC BLOOD PRESSURE: 126 MMHG

## 2023-11-27 DIAGNOSIS — E66.9 OBESITY (BMI 30-39.9): ICD-10-CM

## 2023-11-27 DIAGNOSIS — F41.8 DEPRESSION WITH ANXIETY: ICD-10-CM

## 2023-11-27 DIAGNOSIS — E78.5 DYSLIPIDEMIA: Primary | ICD-10-CM

## 2023-11-27 DIAGNOSIS — E28.2 PCOS (POLYCYSTIC OVARIAN SYNDROME): ICD-10-CM

## 2023-11-27 DIAGNOSIS — E88.819 INSULIN RESISTANCE: ICD-10-CM

## 2023-11-27 DIAGNOSIS — R63.2 BINGE EATING: ICD-10-CM

## 2023-11-27 PROCEDURE — 3079F DIAST BP 80-89 MM HG: CPT | Performed by: NURSE PRACTITIONER

## 2023-11-27 PROCEDURE — 3008F BODY MASS INDEX DOCD: CPT | Performed by: NURSE PRACTITIONER

## 2023-11-27 PROCEDURE — 3074F SYST BP LT 130 MM HG: CPT | Performed by: NURSE PRACTITIONER

## 2023-11-27 PROCEDURE — 99204 OFFICE O/P NEW MOD 45 MIN: CPT | Performed by: NURSE PRACTITIONER

## 2023-11-27 RX ORDER — ONDANSETRON 4 MG/1
4 TABLET, ORALLY DISINTEGRATING ORAL EVERY 8 HOURS PRN
COMMUNITY
Start: 2023-11-27

## 2023-11-27 NOTE — PATIENT INSTRUCTIONS
Breakfast ideas:  1. Fruit and nuts/seeds. 2. Eggs scrambled with vegetables. 3. Oatmeal (stovetop), cinnamon, 2 tbsp flaxseed, berries, nuts. 4. Protein shake + fruit. (1 scoop with water/ice). [facility], [place], [place], and [employer]i are good brands. Daily Calories:  120-174 lbs: 1200 calories/day. 175-219 lbs: 1500 calories/day. 220-249 lbs: 1800 calories/day. 250 lbs or more: 2,000 calories/day. Eat foods high in tryptophan and healthy omega 3 fats (these naturally elevate the happiness hormones and mood): Walnuts, almonds, flaxseeds, lu seeds, sesame seeds, pumpkin seeds, brussel sprouts, cauliflower, broccoli, winter squash    Aim for 75-85 grams protein/day. 25 grams/meal.     3 PM nuts/seeds. Eat within 1-3 hours upon waking. Meals between 7 or 9 AM and 7 PM.     6 days on, 1 day off. Cronometer for tracking. Add psyllium husk daily. Mary. Build up to 35-40 grams of fiber/day. Aim for 64 oz of water/day. Aim for a total of:  2 fruits a day (avocado, tomato, citrus/oranges, apples, berries)  1/2 cup or medium size    4 non-starchy vegetables/day (1/2 cup cooked; 1 cup raw) (greens, peppers, onions, garlic, broccoli, cauliflower, brussels sprouts, asparagus, etc.)    0-2 starches/day (oatmeal, sweet potato, carrots, brown rice, etc). 3 protein per day (fish, seafood, meat, or plants: salmon, nuts, seeds, shrimp, chicken, turkey, beans, lentils, chickpeas, etc). 2 healthy fats (avocado, avocado oil, olives, olive oil, salmon, nuts, seeds)    I recommend a whole food, plant powered diet with low glycemic index:     Aim for 3 meals a day and 1-2 snacks as needed. Aim for a protein + produce at each meal time. Snacks:  Raw vegetables and hummus, apples and peanut butter, nuts, seeds, fruit, pecans drizzled with organic honey. Use the Healthy Plate method for lunch and dinner:  1/2 right side of plate non-starchy vegetables.   Bottom left 1/4 plate protein. Top left 1/4 starch as desired. Aim for 150 minutes moderate level exercise weekly with 2-3 days strength training. Add Magnesium glycinate 200 to 500 mg/day for sleep. Life Extension. NOW. Garden of Life. Whole Food Brand. [employer]. Pure Encapsulations. Or consider Natural Calm. by Jeanne Hayes  Follow dosage instructions. Start 1 teaspoon and increase up to 3 teaspoons as needed for sleep.

## 2023-12-21 ENCOUNTER — OFFICE VISIT (OUTPATIENT)
Dept: DERMATOLOGY CLINIC | Facility: CLINIC | Age: 23
End: 2023-12-21

## 2023-12-21 DIAGNOSIS — L70.0 ACNE VULGARIS: Primary | ICD-10-CM

## 2023-12-21 DIAGNOSIS — L40.0 PSORIASIS VULGARIS: ICD-10-CM

## 2023-12-21 DIAGNOSIS — E28.2 PCOS (POLYCYSTIC OVARIAN SYNDROME): ICD-10-CM

## 2023-12-21 DIAGNOSIS — L70.9 ACNE, UNSPECIFIED ACNE TYPE: ICD-10-CM

## 2023-12-21 DIAGNOSIS — L91.0 KELOID SCAR: ICD-10-CM

## 2023-12-21 DIAGNOSIS — L50.3 DERMATOGRAPHISM: ICD-10-CM

## 2023-12-21 PROCEDURE — 99214 OFFICE O/P EST MOD 30 MIN: CPT | Performed by: DERMATOLOGY

## 2023-12-21 RX ORDER — SPIRONOLACTONE 50 MG/1
150 TABLET, FILM COATED ORAL DAILY
Qty: 270 TABLET | Refills: 1 | Status: SHIPPED | OUTPATIENT
Start: 2023-12-21

## 2023-12-21 RX ORDER — METFORMIN HYDROCHLORIDE 1000 MG/1
1000 TABLET, FILM COATED, EXTENDED RELEASE ORAL 2 TIMES DAILY WITH MEALS
Qty: 60 TABLET | Refills: 3 | Status: SHIPPED | OUTPATIENT
Start: 2023-12-21

## 2023-12-21 RX ORDER — MULTIVIT-MIN/IRON/FOLIC ACID/K 18-600-40
CAPSULE ORAL
COMMUNITY

## 2023-12-21 RX ORDER — CALCIPOTRIENE 50 UG/G
1 CREAM TOPICAL 2 TIMES DAILY
Qty: 60 G | Refills: 12 | Status: SHIPPED | OUTPATIENT
Start: 2023-12-21

## 2023-12-24 NOTE — PROGRESS NOTES
Torrie Sutton is a 26year old female. Chief Complaint   Patient presents with    Acne     LOV 9/2023. Pt presents for acne f/u. RX spironolactone, Tretinoin, and clindamycin. Pt is having issues with dryness but breakouts have decreased. Currently using moisturizers Kiehls, elf sleeping mask, and argon oil. Patient has no known allergies. Current Outpatient Medications   Medication Sig Dispense Refill    Cholecalciferol (VITAMIN D) 50 MCG (2000 UT) Oral Cap Take by mouth. spironolactone 50 MG Oral Tab Take 3 tablets (150 mg total) by mouth daily. 270 tablet 1    calcipotriene 0.005 % External Cream Apply 1 Application topically 2 (two) times daily. To psoriasis knees and to shoulders 60 g 12    metFORMIN HCl ER, MOD, 1000 MG (MOD) Oral Tablet 24 Hr Take 1 tablet (1,000 mg total) by mouth 2 (two) times daily with meals. 60 tablet 3    ondansetron 4 MG Oral Tablet Dispersible Take 1 tablet (4 mg total) by mouth every 8 (eight) hours as needed for Nausea. BLISOVI FE 1/20 1-20 MG-MCG Oral Tab Take 1 tablet by mouth daily. clindamycin 1 % External Lotion Apply 1 Application  topically 2 (two) times daily. Apply thin film to affected area(s). For acne 60 mL 6    ketoconazole 2 % External Shampoo Apply 10 mL topically twice a week. 120 mL 2    Tretinoin 0.05 % External Cream Apply a small pea sized amount to areas of acne on the face as directed at bedtime. Use over serums, clindamycin and moistruizers 20 g 6    metFORMIN 500 MG Oral Tab Take 1 tablet (500 mg total) by mouth 2 (two) times daily with meals. 60 tablet 3    Mometasone Furoate 0.1 % External Solution Use bid to scalp 60 mL 6    LEVOCETIRIZINE 5 MG Oral Tab TAKE 1 TABLET(5 MG) BY MOUTH EVERY EVENING 90 tablet 1    lisdexamfetamine (VYVANSE) 30 MG Oral Cap Take 1 capsule (30 mg total) by mouth daily.  (Patient not taking: Reported on 11/14/2023) 30 capsule 0    Hydrocortisone 2.5 % External Lotion Use bid to itchy areas on legs (Patient not taking: Reported on 12/21/2023) 120 mL 5    ALPRAZolam 0.25 MG Oral Tab Take 1 tablet (0.25 mg total) by mouth 2 (two) times daily as needed. (Patient not taking: Reported on 11/14/2023) 30 tablet 0      History reviewed. No pertinent past medical history. Social History:  Social History     Socioeconomic History    Marital status: Single   Tobacco Use    Smoking status: Never    Smokeless tobacco: Never   Vaping Use    Vaping Use: Never used   Substance and Sexual Activity    Alcohol use: Not Currently    Drug use: Never    Sexual activity: Never     Birth control/protection: Pill, OCP   Other Topics Concern    Grew up on a farm No    History of tanning Yes    Outdoor occupation No    Breast feeding No    Reaction to local anesthetic No    Pt has a pacemaker No    Pt has a defibrillator No                 Current Outpatient Medications   Medication Sig Dispense Refill    Cholecalciferol (VITAMIN D) 50 MCG (2000 UT) Oral Cap Take by mouth. spironolactone 50 MG Oral Tab Take 3 tablets (150 mg total) by mouth daily. 270 tablet 1    calcipotriene 0.005 % External Cream Apply 1 Application topically 2 (two) times daily. To psoriasis knees and to shoulders 60 g 12    metFORMIN HCl ER, MOD, 1000 MG (MOD) Oral Tablet 24 Hr Take 1 tablet (1,000 mg total) by mouth 2 (two) times daily with meals. 60 tablet 3    ondansetron 4 MG Oral Tablet Dispersible Take 1 tablet (4 mg total) by mouth every 8 (eight) hours as needed for Nausea. BLISOVI FE 1/20 1-20 MG-MCG Oral Tab Take 1 tablet by mouth daily. clindamycin 1 % External Lotion Apply 1 Application  topically 2 (two) times daily. Apply thin film to affected area(s). For acne 60 mL 6    ketoconazole 2 % External Shampoo Apply 10 mL topically twice a week. 120 mL 2    Tretinoin 0.05 % External Cream Apply a small pea sized amount to areas of acne on the face as directed at bedtime.  Use over serums, clindamycin and moistruizers 20 g 6 metFORMIN 500 MG Oral Tab Take 1 tablet (500 mg total) by mouth 2 (two) times daily with meals. 60 tablet 3    Mometasone Furoate 0.1 % External Solution Use bid to scalp 60 mL 6    LEVOCETIRIZINE 5 MG Oral Tab TAKE 1 TABLET(5 MG) BY MOUTH EVERY EVENING 90 tablet 1    lisdexamfetamine (VYVANSE) 30 MG Oral Cap Take 1 capsule (30 mg total) by mouth daily. (Patient not taking: Reported on 11/14/2023) 30 capsule 0    Hydrocortisone 2.5 % External Lotion Use bid to itchy areas on legs (Patient not taking: Reported on 12/21/2023) 120 mL 5    ALPRAZolam 0.25 MG Oral Tab Take 1 tablet (0.25 mg total) by mouth 2 (two) times daily as needed. (Patient not taking: Reported on 11/14/2023) 30 tablet 0     Allergies:   No Known Allergies    History reviewed. No pertinent past medical history. History reviewed. No pertinent surgical history.   Social History     Socioeconomic History    Marital status: Single     Spouse name: Not on file    Number of children: Not on file    Years of education: Not on file    Highest education level: Not on file   Occupational History    Not on file   Tobacco Use    Smoking status: Never    Smokeless tobacco: Never   Vaping Use    Vaping Use: Never used   Substance and Sexual Activity    Alcohol use: Not Currently    Drug use: Never    Sexual activity: Never     Birth control/protection: Pill, OCP   Other Topics Concern    Grew up on a farm No    History of tanning Yes    Outdoor occupation No    Breast feeding No    Reaction to local anesthetic No    Pt has a pacemaker No    Pt has a defibrillator No   Social History Narrative    Not on file     Social Determinants of Health     Financial Resource Strain: Not on file   Food Insecurity: Not on file   Transportation Needs: Not on file   Physical Activity: Not on file   Stress: Not on file   Social Connections: Not on file   Housing Stability: Not on file     Family History   Problem Relation Age of Onset    Diabetes Mother     Cancer Maternal Grandmother         ovarian                      HPI :      Chief Complaint   Patient presents with    Acne     LOV 9/2023. Pt presents for acne f/u. RX spironolactone, Tretinoin, and clindamycin. Pt is having issues with dryness but breakouts have decreased. Currently using moisturizers Kiehls, elf sleeping mask, and argon oil. Patient seeing endocrinology and OB. PCOS insulin resistance prediabetes on metformin and spironolactone. Is been taking the metformin more consistently has significant nausea with this on thousand twice daily milligrams, spironolactone tolerating current dose, has noted improvement in acne with regimen. Requests extended release metformin due to nausea. Significant improvement in her acne since adding this and OCP      Use of tretinoin when limited by irritation and dryness    New acne cystic lesions on jawline and neck    Dermographism still problematic. Xyzal helps. Dermographism still not controlled. Patient presents with concerns above. Past notes/ records and appropriate/relevant lab results including pathology and past body maps reviewed. Updated and new information noted in current visit. ROS:    Denies any other systemic complaints. No fevers, chills, night sweats, sensitivity to the sun, deeper lumps or bumps. No other skin complaints. History, medications, allergies as noted. Physical examination: Patient  well-developed well-nourished, alert oriented in no acute distress. Exam of involved, appropriate areas of skin performed, including scalp, head, neck, face,nails, hair, external eyes, including conjunctival mucosa, eyelids, lips, external ears, back, chest, abdomen, arms, legs, palms. Remarkable for lesions as noted   Grade 2-3 acne cystic lesions chin jawline, lateral neck. Chest back shoulders fairly stable no new keloids.   Psoriasis scalp stable, still 3+ dermographism especially legs     ASSESSMENT AND PLAN:     Encounter Diagnoses Name Primary? Acne, unspecified acne type     Keloid scar     Psoriasis vulgaris     Acne vulgaris Yes    Dermatographism     PCOS (polycystic ovarian syndrome)        Assessment / plan:    Acne. See medications in grid. Skin care regimen discussed at length including cleansers, makeup, face washing, sunscreen. Recheck in 6 -8 weeks if no improvement. Notify us promptly if problems tolerating regimen. Consider more aggressive therapy if not responding. Improving nodular lesions    Has been taking spironolactone and metformin more consistently again requests extended release due to GI upset significant improvement with this and OCP is followed by endocrinology and OB as well insulin resistance PCOS, obesity, dyslipidemia noted. Associations with psoriasis noted as well  Aldactone should be taken with caution against pregnancy as it can cause male genitourinary defects with pregnancy. Usual side effects --possible more frequent urination, lower blood pressure, possible breast tenderness, irregular menses. Continue topicals as tolerated. More postinflammatory changes are noted with erythema, some scarring,  Changed to extended release medically necessary  150 mg of spironolactone currently working consider increasing patient is seen bariatric doctor also    Labs reviewed glucose 114 A1c 5.6 LFTs normal renal function normal potassium 4.3 normal TSH normal insulin level elevated at 39.3 as noted previously DHEA-S 367 testosterone 37% free and weakly bound elevated  Psoriasis fairly stable continue current regimen add calcipotriene cream twice daily may use this on ears involvement on back shoulders and neck as well area is sensitive to topical steroids uses clobetasol with improvement on the knees incomplete clearing will add the cause of trying to these areas as well continue moisturizers   Psoriasis. Plaque-type. 10% body surface involvement. Will treat with medications and grid.   Overall skincare, liberal use of emollients discussed. Consider more aggressive therapy if worsening. Patient will let us know how they are doing over the next several weeks. Await clinical response to above therapy. Recheck in 2-3 months if no improvement. Dermographism continue Dupixent using daily twice daily,  Hydrocortisone lotion twice daily to legs. We will recheck in 3 to 4 months. Pathophysiology discussed with patient. Therapeutic options reviewed. See  Medications in grid. Instructions reviewed at length. General skin care questions answered. Reassurance regarding benign skin lesions. Signs and symptoms of skin cancer, ABCDE's of melanoma briefly reviewed. Sunscreen use, sun protection, encouraged. Followup as noted in rtc or p.r.n. Encounter Times new patient  Including precharting, reviewing chart, prior notes obtaining history: 10 minutes, medical exam :10 minutes, notes on body map, plan, counseling 10minutes My total time spent caring for the patient on the day of the encounter: 30 minutes     The patient indicates understanding of these issues and agrees to the plan. The patient is asked to return as noted in follow-up /as noted above    This note was generated using Dragon voice recognition software. Please contact me regarding any confusion resulting from errors in recognition. Note to patient and family: The [facility] makes medical notes like these available to patients. However, be advised this is a medical document. It is intended as [id number] communication and monitoring of a patient's care needs. It is written in medical language and may contain abbreviations or verbiage that are unfamiliar. It may appear blunt or direct. Medical documents are intended to carry relevant information, facts as evident and the clinical opinion of the practitioner. No orders of the defined types were placed in this encounter.       Meds & Refills for this Visit:   Requested Prescriptions Signed Prescriptions Disp Refills    spironolactone 50 MG Oral Tab 270 tablet 1     Sig: Take 3 tablets (150 mg total) by mouth daily. calcipotriene 0.005 % External Cream 60 g 12     Sig: Apply 1 Application topically 2 (two) times daily. To psoriasis knees and to shoulders    metFORMIN HCl ER, MOD, 1000 MG (MOD) Oral Tablet 24 Hr 60 tablet 3     Sig: Take 1 tablet (1,000 mg total) by mouth 2 (two) times daily with meals. Acne vulgaris  (primary encounter diagnosis)  Psoriasis vulgaris  Keloid scar  Dermatographism  Abnormal menses    No orders of the defined types were placed in this encounter. Results From Past 48 Hours:  No results found for this or any previous visit (from the past 48 hour(s)). Meds This Visit:      Imaging Orders:  None     Referral Orders:  No orders of the defined types were placed in this encounter.

## 2023-12-28 ENCOUNTER — TELEPHONE (OUTPATIENT)
Dept: DERMATOLOGY CLINIC | Facility: CLINIC | Age: 23
End: 2023-12-28

## 2023-12-28 NOTE — TELEPHONE ENCOUNTER
LOV 12/21/23 - Dr. Perry Board - pt's insurance will not cover the modified metformin. Please advise. Thank you.

## 2023-12-28 NOTE — TELEPHONE ENCOUNTER
Fax from [phone]    Patients insurance requires new prescription for METFORMIN ER    Placed fax in pa inbox

## 2024-01-05 ENCOUNTER — OFFICE VISIT (OUTPATIENT)
Dept: FAMILY MEDICINE CLINIC | Facility: CLINIC | Age: 24
End: 2024-01-05

## 2024-01-05 VITALS
WEIGHT: 230 LBS | DIASTOLIC BLOOD PRESSURE: 84 MMHG | BODY MASS INDEX: 39 KG/M2 | SYSTOLIC BLOOD PRESSURE: 122 MMHG | HEART RATE: 101 BPM

## 2024-01-05 DIAGNOSIS — F41.8 DEPRESSION WITH ANXIETY: ICD-10-CM

## 2024-01-05 DIAGNOSIS — E88.819 INSULIN RESISTANCE: ICD-10-CM

## 2024-01-05 DIAGNOSIS — R63.2 BINGE EATING: Primary | ICD-10-CM

## 2024-01-05 DIAGNOSIS — R73.03 PRE-DIABETES: ICD-10-CM

## 2024-01-05 DIAGNOSIS — E28.2 PCOS (POLYCYSTIC OVARIAN SYNDROME): ICD-10-CM

## 2024-01-05 PROCEDURE — 3079F DIAST BP 80-89 MM HG: CPT | Performed by: FAMILY MEDICINE

## 2024-01-05 PROCEDURE — 3074F SYST BP LT 130 MM HG: CPT | Performed by: FAMILY MEDICINE

## 2024-01-05 PROCEDURE — 99214 OFFICE O/P EST MOD 30 MIN: CPT | Performed by: FAMILY MEDICINE

## 2024-01-05 RX ORDER — LISDEXAMFETAMINE DIMESYLATE CAPSULES 50 MG/1
50 CAPSULE ORAL DAILY
Qty: 30 CAPSULE | Refills: 0 | Status: SHIPPED | OUTPATIENT
Start: 2024-03-07 | End: 2024-04-06

## 2024-01-05 RX ORDER — LISDEXAMFETAMINE DIMESYLATE CAPSULES 50 MG/1
50 CAPSULE ORAL DAILY
Qty: 30 CAPSULE | Refills: 0 | Status: SHIPPED | OUTPATIENT
Start: 2024-01-05 | End: 2024-02-04

## 2024-01-05 RX ORDER — BUPROPION HYDROCHLORIDE 150 MG/1
TABLET ORAL
Qty: 14 TABLET | Refills: 0 | Status: SHIPPED | OUTPATIENT
Start: 2024-01-05

## 2024-01-05 RX ORDER — LISDEXAMFETAMINE DIMESYLATE CAPSULES 50 MG/1
50 CAPSULE ORAL DAILY
Qty: 30 CAPSULE | Refills: 0 | Status: SHIPPED | OUTPATIENT
Start: 2024-02-05 | End: 2024-03-06

## 2024-01-05 RX ORDER — METFORMIN HYDROCHLORIDE 500 MG/1
1000 TABLET, EXTENDED RELEASE ORAL 2 TIMES DAILY WITH MEALS
Qty: 360 TABLET | Refills: 0 | Status: SHIPPED | OUTPATIENT
Start: 2024-01-05 | End: 2024-04-04

## 2024-01-05 NOTE — PROGRESS NOTES
Chief Complaint   Patient presents with    Medication Follow-Up    Complete Form     Request accommodations letter     HPI:   Candace Boyd is a 23 year old female who presents to clinic for follow-up.  The Vyvanse has been helping with her binge eating, interested in increasing her dosing.  She recently did establish care with bariatrics.     + taking metformin   Needs accomodation letter for school for ADD.    REVIEW OF SYSTEMS:   Negative, except per HPI.     EXAM:   /84 (BP Location: Right arm, Patient Position: Sitting, Cuff Size: large)   Pulse 101   Wt 230 lb (104.3 kg)   LMP 11/23/2023 (Approximate)   BMI 39.23 kg/m²   Body mass index is 39.23 kg/m².  GENERAL: well developed, well nourished, in no apparent distress  SKIN: no rashes, no suspicious lesions  HEENT: atraumatic, normocephalic  EYES: PERRLA, EOMI,conjunctiva are clear  NECK: supple, no adenopathy    ASSESSMENT AND PLAN:     1. Binge eating  [place]  Appropriate use discussed with patient.  INC DOSE FROM 30 TO 50 MG  - lisdexamfetamine (VYVANSE) 50 MG Oral Cap; Take 1 capsule (50 mg total) by mouth daily.  Dispense: 30 capsule; Refill: 0  - lisdexamfetamine (VYVANSE) 50 MG Oral Cap; Take 1 capsule (50 mg total) by mouth daily.  Dispense: 30 capsule; Refill: 0  - lisdexamfetamine (VYVANSE) 50 MG Oral Cap; Take 1 capsule (50 mg total) by mouth daily.  Dispense: 30 capsule; Refill: 0    2. Depression with anxiety  - Sees neuropsych, wants to taper off wellbutrin   - buPROPion  MG Oral Tablet 24 Hr; Take daily for one week then one day on, one day off for a week and discontinue medication thereafter.  Dispense: 14 tablet; Refill: 0    3. Pre-diabetes  - metFORMIN  MG Oral Tablet 24 Hr; Take 2 tablets (1,000 mg total) by mouth 2 (two) times daily with meals.  Dispense: 360 tablet; Refill: 0    4. PCOS (polycystic ovarian syndrome)  - follow with endo  - metFORMIN  MG Oral Tablet 24 Hr; Take 2  tablets (1,000 mg total) by mouth 2 (two) times daily with meals.  Dispense: 360 tablet; Refill: 0    5. Insulin resistance  - metFORMIN  MG Oral Tablet 24 Hr; Take 2 tablets (1,000 mg total) by mouth 2 (two) times daily with meals.  Dispense: 360 tablet; Refill: 0    Patient needs accommodations letter for school, she will draft the letter and send it back to me for review.   RTC if no improvement in symptoms. Red flags discussed to go to ER.     Chayo Hayden MD  1/5/2024  3:49 PM

## 2024-01-17 ENCOUNTER — TELEMEDICINE (OUTPATIENT)
Dept: SURGERY | Facility: CLINIC | Age: 24
End: 2024-01-17
Payer: MEDICAID

## 2024-01-17 VITALS — WEIGHT: 230 LBS | BODY MASS INDEX: 39 KG/M2

## 2024-01-17 DIAGNOSIS — E88.819 INSULIN RESISTANCE: ICD-10-CM

## 2024-01-17 DIAGNOSIS — E28.2 PCOS (POLYCYSTIC OVARIAN SYNDROME): ICD-10-CM

## 2024-01-17 DIAGNOSIS — R63.2 BINGE EATING: ICD-10-CM

## 2024-01-17 DIAGNOSIS — F41.8 DEPRESSION WITH ANXIETY: ICD-10-CM

## 2024-01-17 DIAGNOSIS — E78.5 DYSLIPIDEMIA: Primary | ICD-10-CM

## 2024-01-17 DIAGNOSIS — E66.9 OBESITY (BMI 30-39.9): ICD-10-CM

## 2024-01-17 PROCEDURE — 99213 OFFICE O/P EST LOW 20 MIN: CPT | Performed by: NURSE PRACTITIONER

## 2024-01-17 NOTE — PROGRESS NOTES
Virtual Video & Audio Check-In    Candace Boyd verbally consents to a Virtual Video & Audio Check-In visit on 24.  Patient has been referred to the [facility] website at [email or web address] to review the yearly Consent to Treat document.    Patient understands and accepts financial responsibility for any deductible, co-insurance and/or co-pays associated with this service.    Duration of the service: 11 minutes.    Summary of topics discussed: Obesity/weight management, Lifestyle and behavior modifications, Medication management.      [facility]  [address]  [place] [address]  Dept: [phone]       Patient:  Candace Boyd  :      2000  MRN:      [id number]    Chief Complaint:    Chief Complaint   Patient presents with    Follow - Up    Obesity    Weight Management       SUBJECTIVE     History of Present Illness:  Candace is being seen today for a follow-up for weight management.    Doing well.    Initial HPI:  22 yo female.   Presents to clinic for assistance with weight loss/maintenance.   Reports first noticing weight gain during puberty.   Hx PCOS.   Describes nausea regularly.   Recently started Vyvanse- has lost close to 10 lbs. Also takes/tolerating metformin.     Patient is considering medications and is not considering bariatric surgery for weight loss.    Patient has history of eating disorder(s).   Binge Eating.    Patient is in college, works a student job.  Patient lives with dad, Theresa (grandma).    Patient's goal weight:   Biggest weight loss in the past: minimal   How weight loss was achieved: exercise   Heaviest weight ever:  Previous use of medical weight loss medications:    Physical activity:   Recently stopped    Sleep: 5-10 hours/night    Sleep screening:       Past Medical History: History reviewed. No pertinent past medical history.     Comorbidities:  Hyperlipidemia-Improvement?  yes    OBJECTIVE      Vitals: Wt 230 lb (104.3 kg)   LMP 11/23/2023 (Approximate)   BMI 39.23 kg/m²     Initial weight loss: -04   Total weight loss: -04    Start weight: 234    Wt Readings from Last 6 Encounters:   01/17/24 230 lb (104.3 kg)   01/05/24 230 lb (104.3 kg)   11/27/23 234 lb (106.1 kg)   11/14/23 239 lb (108.4 kg)   10/18/23 239 lb (108.4 kg)   10/02/23 237 lb (107.5 kg)       Patient Medications:    Current Outpatient Medications   Medication Sig Dispense Refill    lisdexamfetamine (VYVANSE) 50 MG Oral Cap Take 1 capsule (50 mg total) by mouth daily. 30 capsule 0    [START ON 2/5/2024] lisdexamfetamine (VYVANSE) 50 MG Oral Cap Take 1 capsule (50 mg total) by mouth daily. 30 capsule 0    [START ON 3/7/2024] lisdexamfetamine (VYVANSE) 50 MG Oral Cap Take 1 capsule (50 mg total) by mouth daily. 30 capsule 0    buPROPion  MG Oral Tablet 24 Hr Take daily for one week then one day on, one day off for a week and discontinue medication thereafter. 14 tablet 0    metFORMIN  MG Oral Tablet 24 Hr Take 2 tablets (1,000 mg total) by mouth 2 (two) times daily with meals. 360 tablet 0    Cholecalciferol (VITAMIN D) 50 MCG (2000 UT) Oral Cap Take by mouth.      spironolactone 50 MG Oral Tab Take 3 tablets (150 mg total) by mouth daily. 270 tablet 1    calcipotriene 0.005 % External Cream Apply 1 Application topically 2 (two) times daily. To psoriasis knees and to shoulders 60 g 12    ondansetron 4 MG Oral Tablet Dispersible Take 1 tablet (4 mg total) by mouth every 8 (eight) hours as needed for Nausea.      BLISOVI FE 1/20 1-20 MG-MCG Oral Tab Take 1 tablet by mouth daily.      clindamycin 1 % External Lotion Apply 1 Application  topically 2 (two) times daily. Apply thin film to affected area(s). For acne 60 mL 6    ketoconazole 2 % External Shampoo Apply 10 mL topically twice a week. 120 mL 2    Tretinoin 0.05 % External Cream Apply a small pea sized amount to areas of acne on the face as directed at bedtime. Use over  serums, clindamycin and moistruizers 20 g 6    Mometasone Furoate 0.1 % External Solution Use bid to scalp 60 mL 6    LEVOCETIRIZINE 5 MG Oral Tab TAKE 1 TABLET(5 MG) BY MOUTH EVERY EVENING 90 tablet 1     Allergies:  Patient has no known allergies.     Social History: Reviewed     Surgical History:  History reviewed. No pertinent surgical history.  Family History:    Family History   Problem Relation Age of Onset    Diabetes Mother     Cancer Maternal Grandmother         ovarian     Initial Intake:  After dinner behavior: -  Night eating: -  Portion sizes: +  Binge: +  Emotional: +  Depression: Score: 17  Grazing:   Sweet tooth:  Crunchy/salty:  Etoh: rare binge drink episodes since turning age 23  Drinks water, coffee  Soda Drinker: Yes                 If yes, how much?:  Rare  Sports Drinks:  No                  Juice:  No                     Number of restaurant or fast food meals/week:  meals/week    Food Journal  Reviewed and Discussed:       Patient has a Food Journal?: no   Patient is reading nutrition labels?  yes  Average Caloric Intake:     Average CHO Intake:   Is patient exercising? no  Type of exercise?     Eating Habits  Patient states the following:  Eats 3 meal(s) per day  Length of time it takes to consume a meal:    # of snacks per day:    Type of snacks:    Amount of soda consumption per day:    Amount of water (in ounces) per day:    Toughest challenge: food, exercise     B: fruit, nuts; protein shake    L: Veggie salad/peppers, cucumbers, ACV, seasonings   D: Take out, Theresa's cooking   Hummus, pretzels, PB/celery    Nutritional Goals  Eat 3-4 cups of fresh fruits or vegetables daily    Behavior Modifications Reviewed and Discussed  Eat breakfast, Eat 3 meals per day, Plan meals in advance, Read nutrition labels, Drink 64 oz of water per day, Maintain a daily food journal, Utlize portion control strategies to reduce calorie intake, Identify triggers for eating and manage cues, and Eat slowly and  take 20 to 30 minutes to complete each meal    Exercise Goals Reviewed and Discussed    Aim for 150 minutes moderate level exercise weekly with 2-3 days strength training as tolerated    ROS:    Constitutional: positive for fatigue  Respiratory: negative  Cardiovascular: negative  Gastrointestinal: negative  Integument/breast: positive for acne   Hematologic/lymphatic: negative  Musculoskeletal:positive for arthralgias  Neurological: negative  Behavioral/Psych: positive for anxiety and depression  Endocrine: negative  All other systems were reviewed and are negative    Physical Exam:  General: alert, oriented x 3, cooperative, speaking in full sentences, appears stated age and cooperative, obese   Head: Normocephalic, without obvious abnormality, atraumatic  Neck: symmetrical, trachea midline   Lungs: No increased work of breathing   Extremities: extremities normal  Skin: Upper body skin color and texture appear intact       ASSESSMENT       Encounter Diagnosis(ses):   Encounter Diagnoses   Name Primary?    Dyslipidemia Yes    Insulin resistance     Depression with anxiety     Binge eating     PCOS (polycystic ovarian syndrome)     Obesity (BMI 30-39.9)        PLAN         Diagnoses and all orders for this visit:    Dyslipidemia    Insulin resistance    Depression with anxiety    Binge eating    PCOS (polycystic ovarian syndrome)    Obesity (BMI 30-39.9)      DYSLIPIDEMIA: Recommend dietary changes and lifestyle modifications as discussed below. Monitor.      Lab Results   Component Value Date/Time    CHOLEST 198 10/25/2023 08:07 AM    LDL 81 10/25/2023 08:07 AM    HDL 85 (H) 10/25/2023 08:07 AM    TRIG 194 (H) 10/25/2023 08:07 AM    VLDL 31 (H) 10/25/2023 08:07 AM     OBESITY/WEIGHT GAIN:     Recommended intensive lifestyle and behavioral modifications at this time for weight loss.     Educated patient on lifestyle modifications: Whole Food/Plant Strong/Low Glycemic Index diet, moderate alcohol consumption, reduced  sodium intake to no more than 2,400 mg/day, and at least 150 minutes of moderate physical activity per week.    Avoid processed, poor quality carbohydrates, refined grains, flour, sugar.     Goals for next month:  1. Keep a food log.  2. Drink 64 ounces of non-caloric beverages per day. No fruit juices or regular soda.  3. Aim for 150 minutes moderate exercise per week.    4. Increase fruit and vegetable servings to 5-6 per day.    5. Improve sleep and stress.      Reviewed labs:  7/6/23- A1c 5.9.  10/25/23- a1c 5.6. . Fasting insulin elevated.      Discussed medication options for weight loss in detail with patient.      Continue metformin, wellbutrin, and Vyvanse as ordered.      Lifestyle Medicine Assessment.  Healthy Plate Method.  Daily Inactive Calories: 220-249 lbs: 1800 calories/day.   Aim for 75-85 grams protein/day.  Improve breakfast to whole food, non-processed.     Exercise plan for next visit.     RTC 6-8 weeks.      MYRNA Ng

## 2024-01-17 NOTE — PROGRESS NOTES
Subjective:   Candace Boyd is a 23 year old female who presents for Physical (Pt here for annual physical)   Patient is a pleasant 23-year-old female with past medical history significant for obesity and PCOS.  Patient presents to office today for physical.  Patient states her health is not good, she can count issues she has on 2 hands. PCOS are causing issues she wants to solve. Has made her gain weight. Seeing weight doctor, since last August has lost 10lbs.     Diet: Back and forth. Someday's healthy, sometimes processed foods with additives. Not a big cook, Theresa cooks and she loves her oils and pasta. Dad likes to eat out.   Exercise: Has not been to gym in a couple months. Knows she needs to get back at it. Will walk dogs more.   Sleep: Sleep has been off since she is on winter break. Usually gets more sleep, now about 4 hours. Needs a routine to get back to 8 hours per night.   Stress: Always had major stress, does not have a good outlet.   Social: Single. Lives with father and Theresa. 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 Transferring next spring. Looking at [employer] [employer]  Sexually Active: No  Prophylaxis: Low dose OCP, use condoms.   Alcohol: Occasionally  Tobacco: No Never  Work: Student job,  at [employer]. Has time to do her Hw.   Vaccinations: hpv, flu. Declines.   Pap: active with OB.  Chlmydia: not sexually active.             History reviewed. No pertinent past medical history.   History reviewed. No pertinent surgical history.     History/Other:    Chief Complaint Reviewed and Verified  Nursing Notes Reviewed and   Verified  Tobacco Reviewed  Allergies Reviewed  Medications Reviewed    Problem List Reviewed  Medical History Reviewed  Surgical History   Reviewed  OB Status Reviewed  Family History Reviewed         Tobacco:  She has never smoked tobacco.    Current Outpatient Medications   Medication Sig Dispense Refill    lisdexamfetamine (VYVANSE) 50 MG Oral Cap  Take 1 capsule (50 mg total) by mouth daily. 30 capsule 0    buPROPion  MG Oral Tablet 24 Hr Take daily for one week then one day on, one day off for a week and discontinue medication thereafter. 14 tablet 0    metFORMIN  MG Oral Tablet 24 Hr Take 2 tablets (1,000 mg total) by mouth 2 (two) times daily with meals. 360 tablet 0    Cholecalciferol (VITAMIN D) 50 MCG (2000 UT) Oral Cap Take by mouth.      spironolactone 50 MG Oral Tab Take 3 tablets (150 mg total) by mouth daily. 270 tablet 1    calcipotriene 0.005 % External Cream Apply 1 Application topically 2 (two) times daily. To psoriasis knees and to shoulders 60 g 12    ondansetron 4 MG Oral Tablet Dispersible Take 1 tablet (4 mg total) by mouth every 8 (eight) hours as needed for Nausea.      BLISOVI FE 1/20 1-20 MG-MCG Oral Tab Take 1 tablet by mouth daily.      clindamycin 1 % External Lotion Apply 1 Application  topically 2 (two) times daily. Apply thin film to affected area(s). For acne 60 mL 6    ketoconazole 2 % External Shampoo Apply 10 mL topically twice a week. 120 mL 2    Tretinoin 0.05 % External Cream Apply a small pea sized amount to areas of acne on the face as directed at bedtime. Use over serums, clindamycin and moistruizers 20 g 6    Mometasone Furoate 0.1 % External Solution Use bid to scalp 60 mL 6    LEVOCETIRIZINE 5 MG Oral Tab TAKE 1 TABLET(5 MG) BY MOUTH EVERY EVENING 90 tablet 1    [START ON 2/5/2024] lisdexamfetamine (VYVANSE) 50 MG Oral Cap Take 1 capsule (50 mg total) by mouth daily. (Patient not taking: Reported on 1/18/2024) 30 capsule 0    [START ON 3/7/2024] lisdexamfetamine (VYVANSE) 50 MG Oral Cap Take 1 capsule (50 mg total) by mouth daily. (Patient not taking: Reported on 1/18/2024) 30 capsule 0         Review of Systems:  Review of Systems   Constitutional: Negative.  Negative for activity change and appetite change.   HENT: Negative.  Negative for congestion, postnasal drip, rhinorrhea, sore throat, tinnitus and  voice change.    Eyes: Negative.    Respiratory: Negative.  Negative for apnea, cough, chest tightness and shortness of breath.    Cardiovascular:  Negative for chest pain and leg swelling.   Gastrointestinal:  Positive for nausea. Negative for abdominal pain, anal bleeding and blood in stool.   Genitourinary: Negative.  Negative for difficulty urinating, flank pain and menstrual problem.   Musculoskeletal: Negative.  Negative for joint swelling.   Skin: Negative.         Acne     Neurological: Negative.  Negative for dizziness and headaches.   Psychiatric/Behavioral:  Positive for decreased concentration. Negative for agitation.          Objective:   /81   Pulse 96   Ht 5' 5\" (1.651 m)   Wt 231 lb (104.8 kg)   LMP 11/23/2023 (Approximate)   BMI 38.44 kg/m²  Estimated body mass index is 38.44 kg/m² as calculated from the following:    Height as of this encounter: 5' 5\" (1.651 m).    Weight as of this encounter: 231 lb (104.8 kg).  Physical Exam  Vitals and nursing note reviewed.   Constitutional:       General: She is not in acute distress.     Appearance: Normal appearance. She is well-developed. She is obese.   HENT:      Head: Normocephalic and atraumatic.      Right Ear: Tympanic membrane, ear canal and external ear normal.      Left Ear: Tympanic membrane, ear canal and external ear normal.      Nose: Nose normal.      Mouth/Throat:      Mouth: Mucous membranes are moist.      Pharynx: Oropharynx is clear.   Eyes:      Conjunctiva/sclera: Conjunctivae normal.      Pupils: Pupils are equal, round, and reactive to light.   Neck:      Thyroid: No thyromegaly.   Cardiovascular:      Rate and Rhythm: Normal rate and regular rhythm.      Pulses: Normal pulses.      Heart sounds: Normal heart sounds. No murmur heard.  Pulmonary:      Effort: Pulmonary effort is normal. No respiratory distress.      Breath sounds: Normal breath sounds. No wheezing or rales.   Chest:      Chest wall: No tenderness.    Abdominal:      General: Bowel sounds are normal. There is no distension.      Palpations: Abdomen is soft.      Tenderness: There is no abdominal tenderness.   Musculoskeletal:         General: No tenderness. Normal range of motion.      Cervical back: Normal range of motion and neck supple.   Lymphadenopathy:      Cervical: No cervical adenopathy.   Skin:     General: Skin is warm and dry.      Capillary Refill: Capillary refill takes less than 2 seconds.      Findings: No rash.      Comments: Acne, comedones on face scattered.    Neurological:      Mental Status: She is alert and oriented to person, place, and time.      Coordination: Coordination normal.   Psychiatric:         Behavior: Behavior normal.         Thought Content: Thought content normal.         Judgment: Judgment normal.           Assessment & Plan:   1. Encounter for wellness examination in adult (Primary)  Assessment & Plan:  Wellness labs ordered.  Encouraged increasing physical activity which includes raising heart rate 3 to 5 days/week for at least 30 minutes at a time, while also performing mild strength exercises.  Patient counseled on importance of dietary modifications, which may include limiting fats, red meat, and carbohydrates, while increasing fruit and vegetable intake, and trying to adhere to a low sodium/Mediterranean diet.  Encouraged to manage stress.  Encouraged good sleep habits.  Encouraged good sexual health.    Patient aware of plan of care. All questions answered to satisfaction of the patient. Patient instructed to call office or reach out via [email or web address] if any issues arise. For urgent issues and/or reviewed red flags please proceed to the urgent care or ER.  Also, inform the nurse practitioner with any new symptoms or medication side effects.      Orders:  -     Hemoglobin A1C; Future; Expected date: 01/18/2024  -     CBC With Differential With Platelet; Future; Expected date: 01/18/2024  -     Comp Metabolic Panel (14);  Future; Expected date: 01/18/2024  -     TSH W Reflex To Free T4; Future; Expected date: 01/18/2024  2. Obesity (BMI 30-39.9)  Assessment & Plan:  Patient currently seeing endocrine doctor, weight doctor, OB doctor, and primary doctor.  Patient knows she needs to get back on the routine of exercising and altering her diet.  Patient taking 50 mg of Vyvanse believes this is helping with her weight loss.  Patient also taking metformin which is assisting with weight loss.  3. Pre-diabetes  Assessment & Plan:  Wellness labs today  Assess A1c  Encourage diet and exercise  Patient reports intermittent nausea on metformin.  Encouraged to take medication with food and consider starting probiotic such as Florastor.  4. PCOS (polycystic ovarian syndrome)  Assessment & Plan:  Patient currently receiving metformin, Aldactone and trying to alter her diet to treat PCOS.  CPM        Return if symptoms worsen or fail to improve.    Shaun Lopez, MYRNA, 1/17/2024, 12:50 PM

## 2024-01-18 ENCOUNTER — OFFICE VISIT (OUTPATIENT)
Dept: FAMILY MEDICINE CLINIC | Facility: CLINIC | Age: 24
End: 2024-01-18

## 2024-01-18 ENCOUNTER — LAB ENCOUNTER (OUTPATIENT)
Dept: LAB | Age: 24
End: 2024-01-18
Payer: MEDICAID

## 2024-01-18 VITALS
SYSTOLIC BLOOD PRESSURE: 137 MMHG | WEIGHT: 231 LBS | HEIGHT: 65 IN | HEART RATE: 96 BPM | DIASTOLIC BLOOD PRESSURE: 81 MMHG | BODY MASS INDEX: 38.49 KG/M2

## 2024-01-18 DIAGNOSIS — E66.9 OBESITY (BMI 30-39.9): ICD-10-CM

## 2024-01-18 DIAGNOSIS — Z00.00 ENCOUNTER FOR WELLNESS EXAMINATION IN ADULT: Primary | ICD-10-CM

## 2024-01-18 DIAGNOSIS — R73.03 PRE-DIABETES: ICD-10-CM

## 2024-01-18 DIAGNOSIS — E28.2 PCOS (POLYCYSTIC OVARIAN SYNDROME): ICD-10-CM

## 2024-01-18 DIAGNOSIS — Z00.00 ENCOUNTER FOR WELLNESS EXAMINATION IN ADULT: ICD-10-CM

## 2024-01-18 LAB
ALBUMIN SERPL-MCNC: 4.6 G/DL (ref 3.2–4.8)
ALBUMIN/GLOB SERPL: 1.5 {RATIO} (ref 1–2)
ALP LIVER SERPL-CCNC: 80 U/L
ALT SERPL-CCNC: 87 U/L
ANION GAP SERPL CALC-SCNC: 3 MMOL/L (ref 0–18)
AST SERPL-CCNC: 41 U/L (ref ?–34)
BASOPHILS # BLD AUTO: 0.05 X10(3) UL (ref 0–0.2)
BASOPHILS NFR BLD AUTO: 0.6 %
BILIRUB SERPL-MCNC: 0.3 MG/DL (ref 0.3–1.2)
BUN BLD-MCNC: 9 MG/DL (ref 9–23)
BUN/CREAT SERPL: 11 (ref 10–20)
CALCIUM BLD-MCNC: 10.1 MG/DL (ref 8.7–10.4)
CHLORIDE SERPL-SCNC: 105 MMOL/L (ref 98–112)
CO2 SERPL-SCNC: 31 MMOL/L (ref 21–32)
CREAT BLD-MCNC: 0.82 MG/DL
DEPRECATED RDW RBC AUTO: 39.8 FL (ref 35.1–46.3)
EGFRCR SERPLBLD CKD-EPI 2021: 104 ML/MIN/1.73M2 (ref 60–?)
EOSINOPHIL # BLD AUTO: 0.17 X10(3) UL (ref 0–0.7)
EOSINOPHIL NFR BLD AUTO: 2.1 %
ERYTHROCYTE [DISTWIDTH] IN BLOOD BY AUTOMATED COUNT: 13.4 % (ref 11–15)
EST. AVERAGE GLUCOSE BLD GHB EST-MCNC: 114 MG/DL (ref 68–126)
FASTING STATUS PATIENT QL REPORTED: NO
GLOBULIN PLAS-MCNC: 3 G/DL (ref 2.8–4.4)
GLUCOSE BLD-MCNC: 116 MG/DL (ref 70–99)
HBA1C MFR BLD: 5.6 % (ref ?–5.7)
HCT VFR BLD AUTO: 42.9 %
HGB BLD-MCNC: 13.7 G/DL
IMM GRANULOCYTES # BLD AUTO: 0.02 X10(3) UL (ref 0–1)
IMM GRANULOCYTES NFR BLD: 0.2 %
LYMPHOCYTES # BLD AUTO: 2.31 X10(3) UL (ref 1–4)
LYMPHOCYTES NFR BLD AUTO: 28.5 %
MCH RBC QN AUTO: 26.2 PG (ref 26–34)
MCHC RBC AUTO-ENTMCNC: 31.9 G/DL (ref 31–37)
MCV RBC AUTO: 82 FL
MONOCYTES # BLD AUTO: 0.76 X10(3) UL (ref 0.1–1)
MONOCYTES NFR BLD AUTO: 9.4 %
NEUTROPHILS # BLD AUTO: 4.8 X10 (3) UL (ref 1.5–7.7)
NEUTROPHILS # BLD AUTO: 4.8 X10(3) UL (ref 1.5–7.7)
NEUTROPHILS NFR BLD AUTO: 59.2 %
OSMOLALITY SERPL CALC.SUM OF ELEC: 288 MOSM/KG (ref 275–295)
PLATELET # BLD AUTO: 459 10(3)UL (ref 150–450)
POTASSIUM SERPL-SCNC: 4.7 MMOL/L (ref 3.5–5.1)
PROT SERPL-MCNC: 7.6 G/DL (ref 5.7–8.2)
RBC # BLD AUTO: 5.23 X10(6)UL
SODIUM SERPL-SCNC: 139 MMOL/L (ref 136–145)
TSI SER-ACNC: 1.41 MIU/ML (ref 0.55–4.78)
WBC # BLD AUTO: 8.1 X10(3) UL (ref 4–11)

## 2024-01-18 PROCEDURE — 99395 PREV VISIT EST AGE 18-39: CPT

## 2024-01-18 PROCEDURE — 3008F BODY MASS INDEX DOCD: CPT

## 2024-01-18 PROCEDURE — 85025 COMPLETE CBC W/AUTO DIFF WBC: CPT

## 2024-01-18 PROCEDURE — 80053 COMPREHEN METABOLIC PANEL: CPT

## 2024-01-18 PROCEDURE — 3075F SYST BP GE 130 - 139MM HG: CPT

## 2024-01-18 PROCEDURE — 83036 HEMOGLOBIN GLYCOSYLATED A1C: CPT

## 2024-01-18 PROCEDURE — 3079F DIAST BP 80-89 MM HG: CPT

## 2024-01-18 PROCEDURE — 84443 ASSAY THYROID STIM HORMONE: CPT

## 2024-01-18 PROCEDURE — 36415 COLL VENOUS BLD VENIPUNCTURE: CPT

## 2024-01-18 NOTE — ASSESSMENT & PLAN NOTE
Wellness labs today  Assess A1c  Encourage diet and exercise  Patient reports intermittent nausea on metformin.  Encouraged to take medication with food and consider starting probiotic such as Florastor.

## 2024-01-18 NOTE — ASSESSMENT & PLAN NOTE
Patient currently seeing endocrine doctor, weight doctor, OB doctor, and primary doctor.  Patient knows she needs to get back on the routine of exercising and altering her diet.  Patient taking 50 mg of Vyvanse believes this is helping with her weight loss.  Patient also taking metformin which is assisting with weight loss.

## 2024-01-18 NOTE — ASSESSMENT & PLAN NOTE
Wellness labs ordered.  Encouraged increasing physical activity which includes raising heart rate 3 to 5 days/week for at least 30 minutes at a time, while also performing mild strength exercises.  Patient counseled on importance of dietary modifications, which may include limiting fats, red meat, and carbohydrates, while increasing fruit and vegetable intake, and trying to adhere to a low sodium/Mediterranean diet.  Encouraged to manage stress.  Encouraged good sleep habits.  Encouraged good sexual health.    Patient aware of plan of care. All questions answered to satisfaction of the patient. Patient instructed to call office or reach out via [employer] if any issues arise. For urgent issues and/or reviewed red flags please proceed to the urgent care or ER.  Also, inform the nurse practitioner with any new symptoms or medication side effects.

## 2024-01-18 NOTE — ASSESSMENT & PLAN NOTE
Patient currently receiving metformin, Aldactone and trying to alter her diet to treat PCOS.  CPM

## 2024-01-20 DIAGNOSIS — R79.89 ELEVATED LFTS: Primary | ICD-10-CM

## 2024-01-20 NOTE — PROGRESS NOTES
1. Elevated LFTs  Elevated AST and ALT.  Has happened in past.  Will reassess in 3 months.    MYRNA Gaston

## 2024-02-14 ENCOUNTER — OFFICE VISIT (OUTPATIENT)
Dept: ENDOCRINOLOGY CLINIC | Facility: CLINIC | Age: 24
End: 2024-02-14

## 2024-02-14 VITALS
SYSTOLIC BLOOD PRESSURE: 91 MMHG | DIASTOLIC BLOOD PRESSURE: 65 MMHG | WEIGHT: 235 LBS | HEIGHT: 65 IN | HEART RATE: 96 BPM | BODY MASS INDEX: 39.15 KG/M2

## 2024-02-14 DIAGNOSIS — E88.819 INSULIN RESISTANCE: ICD-10-CM

## 2024-02-14 DIAGNOSIS — E28.2 PCOS (POLYCYSTIC OVARIAN SYNDROME): Primary | ICD-10-CM

## 2024-02-14 DIAGNOSIS — R73.03 PRE-DIABETES: ICD-10-CM

## 2024-02-14 DIAGNOSIS — E78.5 DYSLIPIDEMIA: ICD-10-CM

## 2024-02-14 PROCEDURE — 99214 OFFICE O/P EST MOD 30 MIN: CPT | Performed by: INTERNAL MEDICINE

## 2024-02-14 NOTE — PROGRESS NOTES
Follow-up - Reason for Visit:  Possible PCOS.  Requesting Physician: Dr. Carbajal.    CHIEF COMPLAINT:    Chief Complaint   Patient presents with    Follow - Up     Pcos and insulin resistance , last labs 1/2024        HISTORY OF PRESENT ILLNESS:   Candace Boyd is a 23 year old female who presents with PCOS for follow-up.     She has acne, increased hair growth and oligo-ovulatory cycles.     She is currently taking metformin ER 1g PO qAM and has not been able to increase past this due to the fact that she forgets to take the evening dose.     She is doing well on the spironolactone (100mg PO qday) and the addition of the combined OCP (lo-estrin Lo), noting some improvement in acne as well as softening and decreased hair growth. Her cycle are still not regular.     She has a binge-eating disorder, anxiety, depression, ADHD since she was a child. She has just been able to come to the doctor to have this treated.     She has pre-diabetes, and the diabetes runs in her family. She has always wanted to try and lose weight, but has not been successful with this.     She is not feeling well on the current OCPs.        PAST MEDICAL HISTORY:   No past medical history on file.    PAST SURGICAL HISTORY:   No past surgical history on file.    SOCIAL HISTORY:    Social History     Socioeconomic History    Marital status: Single   Tobacco Use    Smoking status: Never     Passive exposure: Never    Smokeless tobacco: Never   Vaping Use    Vaping Use: Never used   Substance and Sexual Activity    Alcohol use: Not Currently    Drug use: Never    Sexual activity: Never     Birth control/protection: Pill, OCP   Other Topics Concern    Grew up on a farm No    History of tanning Yes    Outdoor occupation No    Breast feeding No    Reaction to local anesthetic No    Pt has a pacemaker No    Pt has a defibrillator No       FAMILY HISTORY:   Family History   Problem Relation Age of Onset    Diabetes Mother     Cancer Maternal Grandmother          ovarian       CURRENT MEDICATIONS:    Current Outpatient Medications   Medication Sig Dispense Refill    metFORMIN  MG Oral Tablet 24 Hr Take 2 tablets (1,000 mg total) by mouth 2 (two) times daily with meals. 360 tablet 0    lisdexamfetamine (VYVANSE) 50 MG Oral Cap Take 1 capsule (50 mg total) by mouth daily. (Patient not taking: Reported on 1/18/2024) 30 capsule 0    [START ON 3/7/2024] lisdexamfetamine (VYVANSE) 50 MG Oral Cap Take 1 capsule (50 mg total) by mouth daily. (Patient not taking: Reported on 1/18/2024) 30 capsule 0    buPROPion  MG Oral Tablet 24 Hr Take daily for one week then one day on, one day off for a week and discontinue medication thereafter. 14 tablet 0    Cholecalciferol (VITAMIN D) 50 MCG (2000 UT) Oral Cap Take by mouth.      spironolactone 50 MG Oral Tab Take 3 tablets (150 mg total) by mouth daily. 270 tablet 1    calcipotriene 0.005 % External Cream Apply 1 Application topically 2 (two) times daily. To psoriasis knees and to shoulders 60 g 12    ondansetron 4 MG Oral Tablet Dispersible Take 1 tablet (4 mg total) by mouth every 8 (eight) hours as needed for Nausea.      BLISOVI FE 1/20 1-20 MG-MCG Oral Tab Take 1 tablet by mouth daily.      clindamycin 1 % External Lotion Apply 1 Application  topically 2 (two) times daily. Apply thin film to affected area(s). For acne 60 mL 6    ketoconazole 2 % External Shampoo Apply 10 mL topically twice a week. 120 mL 2    Tretinoin 0.05 % External Cream Apply a small pea sized amount to areas of acne on the face as directed at bedtime. Use over serums, clindamycin and moistruizers 20 g 6    Mometasone Furoate 0.1 % External Solution Use bid to scalp 60 mL 6    LEVOCETIRIZINE 5 MG Oral Tab TAKE 1 TABLET(5 MG) BY MOUTH EVERY EVENING 90 tablet 1       ALLERGIES:  No Known Allergies      ASSESSMENTS:     REVIEW OF SYSTEMS:  Constitutional: Negative for: weight change, fever, fatigue, cold/heat intolerance  Eyes: Negative for:  Visual  changes, proptosis, blurring  ENT: Negative for:  dysphagia, neck swelling, dysphonia  Respiratory: Negative for:  dyspnea, cough  Cardiovascular: Negative for:  chest pain, palpitations, orthopnea  GI: Negative for:  abdominal pain, nausea, vomiting, diarrhea, constipation, bleeding  Neurology: Negative for: headache, numbness, weakness  Genito-Urinary: Negative for: dysuria, frequency  Psychiatric: Negative for:  depression, anxiety  Hematology/Lymphatics: Negative for: bruising, lower extremity edema  Endocrine: Negative for: polyuria, polydypsia  Skin: Negative for: rash, blister, cellulitis,      PHYSICAL EXAM:   Vitals:    02/14/24 1632   BP: 91/65   Pulse: 96   Weight: 235 lb (106.6 kg)   Height: 5' 5\" (1.651 m)       BMI: Body mass index is 39.11 kg/m².     CONSTITUTIONAL:  awake, alert, cooperative, no apparent distress, and appears stated age  PSYCH: normal affect  EYES:  No proptosis, no ptosis, conjunctiva normal  SKIN:  no bruising or bleeding, no rashes and no lesions  EXTREMITIES: no edema      DATA:   Reviewed    ASSESSMENT AND PLAN: This is a 23 year-old woman here for follow-up of management of PCOS that has been diagnosed based upon oligo-ovulatory cycles, and biochemical as well as clinical hyperandrogenism. I would like to increase her metformin ER to 2000mg. She should also start vitamin D 2,000 international units qday. I would also like to change her OCPs as well.     She will follow up with me in 3 months with new blood work.     Return to clinic in 3 months      Prior to this encounter, I spent over 15 minutes with preparing for the visit, including reviewing documents from other specialties as well as from PCP and going over test results. During the face to face encounter, I spent an additional 15 minutes which were determined for follow-up. Greater than 50% of the time was spent in counseling, anticipatory guidance, and coordination of care. Patient concerns were answered to the best of  my knowledge.         2/14/24  Martha Mattson MD

## 2024-02-22 RX ORDER — NORETHINDRONE ACETATE AND ETHINYL ESTRADIOL 1.5-30(21)
1 KIT ORAL DAILY
Qty: 28 TABLET | Refills: 12 | Status: SHIPPED | OUTPATIENT
Start: 2024-02-22 | End: 2025-02-21

## 2024-03-11 ENCOUNTER — OFFICE VISIT (OUTPATIENT)
Dept: SURGERY | Facility: CLINIC | Age: 24
End: 2024-03-11
Payer: MEDICAID

## 2024-03-11 VITALS
DIASTOLIC BLOOD PRESSURE: 80 MMHG | OXYGEN SATURATION: 99 % | HEART RATE: 74 BPM | SYSTOLIC BLOOD PRESSURE: 122 MMHG | HEIGHT: 64.2 IN | WEIGHT: 240 LBS | BODY MASS INDEX: 40.97 KG/M2

## 2024-03-11 DIAGNOSIS — F41.8 DEPRESSION WITH ANXIETY: ICD-10-CM

## 2024-03-11 DIAGNOSIS — E66.01 MORBID OBESITY WITH BMI OF 40.0-44.9, ADULT (HCC): ICD-10-CM

## 2024-03-11 DIAGNOSIS — E78.5 DYSLIPIDEMIA: Primary | ICD-10-CM

## 2024-03-11 DIAGNOSIS — E88.819 INSULIN RESISTANCE: ICD-10-CM

## 2024-03-11 DIAGNOSIS — R63.2 BINGE EATING: ICD-10-CM

## 2024-03-11 DIAGNOSIS — E28.2 PCOS (POLYCYSTIC OVARIAN SYNDROME): ICD-10-CM

## 2024-03-11 PROCEDURE — 99214 OFFICE O/P EST MOD 30 MIN: CPT | Performed by: NURSE PRACTITIONER

## 2024-03-11 RX ORDER — LISDEXAMFETAMINE DIMESYLATE CAPSULES 50 MG/1
50 CAPSULE ORAL DAILY
Qty: 30 CAPSULE | Refills: 0 | Status: SHIPPED | OUTPATIENT
Start: 2024-05-12 | End: 2024-06-11

## 2024-03-11 RX ORDER — LISDEXAMFETAMINE DIMESYLATE CAPSULES 50 MG/1
50 CAPSULE ORAL DAILY
Qty: 30 CAPSULE | Refills: 0 | Status: SHIPPED | OUTPATIENT
Start: 2024-03-11 | End: 2024-04-10

## 2024-03-11 RX ORDER — LISDEXAMFETAMINE DIMESYLATE CAPSULES 50 MG/1
50 CAPSULE ORAL DAILY
Qty: 30 CAPSULE | Refills: 0 | Status: SHIPPED | OUTPATIENT
Start: 2024-04-11 | End: 2024-05-11

## 2024-03-11 NOTE — PATIENT INSTRUCTIONS
1/2 cup fruit  1/2 cup vegetables cooked; uncooked 1 cup    Not starchy- asparagus, brussels sprout, cucumber, broccoli, cauliflower, olives     My Net Diary     Aim for 85-90 grams protein/day.  25-30 grams/meal.   3 PM nuts/seeds.   Eat within 1-3 hours upon waking.   Meals between 7 or 9 AM and 7 PM.   6 days on, 1 day off.   Cronometer for tracking.  Add psyllium husk daily. [employer].   Build up to 35-40 grams of fiber/day.   Aim for 64 oz of water/day.    Aim for a total of:  2 fruits a day (avocado, tomato, citrus/oranges, apples, berries)  1/2 cup or medium size    4 non-starchy vegetables/day (1/2 cup cooked; 1 cup raw) (greens, peppers, onions, garlic, broccoli, cauliflower, brussels sprouts, asparagus, etc.)    0-2 starches/day (oatmeal, sweet potato, carrots, brown rice, etc).    3 protein per day (fish, seafood, meat, or plants: salmon, nuts, seeds, shrimp, chicken, turkey, beans, lentils, chickpeas, etc).    2 healthy fats (avocado, avocado oil, olives, olive oil, salmon, nuts, seeds)    I recommend a whole food, plant powered diet with low glycemic index:     Aim for 3 meals a day and 1-2 snacks as needed.    Aim for a protein + produce at each meal time.     Breakfast ideas:  1. Fruit and nuts/seeds.  2. Eggs scrambled with vegetables.  3. Oatmeal (stovetop), cinnamon, 2 tbsp flaxseed, berries, nuts.  4. Protein shake + fruit. (1 scoop with water/ice). Garden of Life Fit, Nutiva, Lazo, and Orgain are good brands.      Snacks:  Raw vegetables and hummus, apples and peanut butter, nuts, seeds, fruit, pecans drizzled with organic honey.      Use the Healthy Plate method for lunch and dinner:  1/2 right side of plate non-starchy vegetables.  Bottom left 1/4 plate protein.  Top left 1/4 starch as desired.      Aim for 150 minutes moderate level exercise weekly with 2-3 days strength training.    Add Magnesium glycinate 200 to 500 mg/day for sleep.   Life Extension. NOW. Garden of Life. Whole Food  Brand. Terrence's. Pure Encapsulations.     Or consider Natural Calm.   by Natural Vitality  Follow dosage instructions.  Start 1 teaspoon and increase up to 3 teaspoons as needed for sleep.

## 2024-03-11 NOTE — PROGRESS NOTES
[facility]  [address] [facility] [address] [place] [id number]  Dept: [phone]       Patient:  Candace Boyd  :      2000  MRN:      [id number]    Chief Complaint:    Chief Complaint   Patient presents with    Follow - Up    Weight Management     Wt ck    Obesity       SUBJECTIVE     History of Present Illness:  Candace is being seen today for a follow-up for weight management.    Reports more binge eating over the past two weeks.   Has not been taking Vyvanse consistently.  Stopped wellbutrin.   Adherent to metformin.       Initial HPI:  22 yo female.   Presents to clinic for assistance with weight loss/maintenance.   Reports first noticing weight gain during puberty.   Hx PCOS.   Describes nausea regularly.   Recently started Vyvanse- has lost close to 10 lbs. Also takes/tolerating metformin.     Patient is considering medications and is not considering bariatric surgery for weight loss.    Patient has history of eating disorder(s).   Binge Eating.    Patient is in college, works a student job.  Patient lives with dad, Theresa (grandma).    Patient's goal weight:   Biggest weight loss in the past: minimal   How weight loss was achieved: exercise   Heaviest weight ever:  Previous use of medical weight loss medications:    Physical activity:   Recently stopped    Sleep: 5-10 hours/night    Sleep screening:       Past Medical History: History reviewed. No pertinent past medical history.     Comorbidities:  Hyperlipidemia-Improvement?  yes    OBJECTIVE     Vitals: /80   Pulse 74   Ht 5' 4.2\" (1.631 m)   Wt 240 lb (108.9 kg)   LMP 2023 (Approximate)   SpO2 99%   BMI 40.94 kg/m²     Initial weight loss: +10   Total weight loss: +06   Start weight: 234    Wt Readings from Last 6 Encounters:   24 240 lb (108.9 kg)   24 235 lb (106.6 kg)   24 231 lb (104.8 kg)   24 230 lb (104.3 kg)   24 230 lb (104.3  kg)   11/27/23 234 lb (106.1 kg)       Patient Medications:    Current Outpatient Medications   Medication Sig Dispense Refill    lisdexamfetamine (VYVANSE) 50 MG Oral Cap Take 1 capsule (50 mg total) by mouth daily. 30 capsule 0    [START ON 4/11/2024] lisdexamfetamine (VYVANSE) 50 MG Oral Cap Take 1 capsule (50 mg total) by mouth daily. 30 capsule 0    [START ON 5/12/2024] lisdexamfetamine (VYVANSE) 50 MG Oral Cap Take 1 capsule (50 mg total) by mouth daily. Brand or generic ok 30 capsule 0    Norethin Ace-Eth Estrad-FE (BLISOVI FE 1.5/30) 1.5-30 MG-MCG Oral Tab Take 1 tablet by mouth daily. 28 tablet 12    lisdexamfetamine (VYVANSE) 50 MG Oral Cap Take 1 capsule (50 mg total) by mouth daily. (Patient not taking: Reported on 1/18/2024) 30 capsule 0    metFORMIN  MG Oral Tablet 24 Hr Take 2 tablets (1,000 mg total) by mouth 2 (two) times daily with meals. 360 tablet 0    Cholecalciferol (VITAMIN D) 50 MCG (2000 UT) Oral Cap Take by mouth.      spironolactone 50 MG Oral Tab Take 3 tablets (150 mg total) by mouth daily. 270 tablet 1    calcipotriene 0.005 % External Cream Apply 1 Application topically 2 (two) times daily. To psoriasis knees and to shoulders 60 g 12    ondansetron 4 MG Oral Tablet Dispersible Take 1 tablet (4 mg total) by mouth every 8 (eight) hours as needed for Nausea.      clindamycin 1 % External Lotion Apply 1 Application  topically 2 (two) times daily. Apply thin film to affected area(s). For acne 60 mL 6    ketoconazole 2 % External Shampoo Apply 10 mL topically twice a week. 120 mL 2    Tretinoin 0.05 % External Cream Apply a small pea sized amount to areas of acne on the face as directed at bedtime. Use over serums, clindamycin and moistruizers 20 g 6    Mometasone Furoate 0.1 % External Solution Use bid to scalp 60 mL 6    LEVOCETIRIZINE 5 MG Oral Tab TAKE 1 TABLET(5 MG) BY MOUTH EVERY EVENING 90 tablet 1     Allergies:  Patient has no known allergies.     Social History: Reviewed      Surgical History:  History reviewed. No pertinent surgical history.  Family History:    Family History   Problem Relation Age of Onset    Diabetes Mother     Cancer Maternal Grandmother         ovarian     Initial Intake:  After dinner behavior: -  Night eating: -  Portion sizes: +  Binge: +  Emotional: +  Depression: Score: 17  Grazing:   Sweet tooth:  Crunchy/salty:  Etoh: rare binge drink episodes since turning age 23  Drinks water, coffee  Soda Drinker: Yes                 If yes, how much?:  Rare  Sports Drinks:  No                  Juice:  No                     Number of restaurant or fast food meals/week:  meals/week    Food Journal  Reviewed and Discussed:       Patient has a Food Journal?: no   Patient is reading nutrition labels?  yes  Average Caloric Intake:     Average CHO Intake:   Is patient exercising? no  Type of exercise?     Eating Habits  Patient states the following:  Eats 3 meal(s) per day  Length of time it takes to consume a meal:    # of snacks per day:    Type of snacks:    Amount of soda consumption per day:    Amount of water (in ounces) per day:    Toughest challenge: food, exercise, dairy free ice cream    Sleep 6 hours/night     B: fruit (berries, bananas, apples, oranges, nuts; protein shake    L: Veggie salad/peppers, cucumbers, ACV, seasonings   D: Take out, Theresa's cooking   Hummus, pretzels, PB/celery  Ice cream     Nutritional Goals  Eat 3-4 cups of fresh fruits or vegetables daily    Behavior Modifications Reviewed and Discussed  Eat breakfast, Eat 3 meals per day, Plan meals in advance, Read nutrition labels, Drink 64 oz of water per day, Maintain a daily food journal, Utlize portion control strategies to reduce calorie intake, Identify triggers for eating and manage cues, and Eat slowly and take 20 to 30 minutes to complete each meal    Exercise Goals Reviewed and Discussed    Aim for 150 minutes moderate level exercise weekly with 2-3 days strength training as  tolerated    ROS:    Constitutional: positive for fatigue  Respiratory: negative  Cardiovascular: negative  Gastrointestinal: negative  Integument/breast: positive for acne   Hematologic/lymphatic: negative  Musculoskeletal:positive for arthralgias  Neurological: negative  Behavioral/Psych: positive for anxiety and depression  Endocrine: negative  All other systems were reviewed and are negative    Physical Exam:  General appearance: alert, appears stated age, cooperative and obese  Head: Normocephalic, without obvious abnormality, atraumatic  Neck: no adenopathy, no carotid bruit, no JVD, supple, symmetrical, trachea midline and thyroid not enlarged, symmetric, no tenderness/mass/nodules  Lungs: clear to auscultation bilaterally  Heart: S1, S2 normal, no murmur, click, rub or gallop, regular rate and rhythm  Abdomen: soft, non-tender; bowel sounds normal; no masses,  no organomegaly and abdomen obese   Extremities: intact, no edema   Pulses: 2+ and symmetric  Skin: intact   Neurologic: Grossly normal       ASSESSMENT       Encounter Diagnosis(ses):   Encounter Diagnoses   Name Primary?    Binge eating     Dyslipidemia Yes    Insulin resistance     PCOS (polycystic ovarian syndrome)     Morbid obesity with BMI of 40.0-44.9, adult (HCC)     Depression with anxiety          PLAN         Diagnoses and all orders for this visit:    Dyslipidemia    Binge eating  -     lisdexamfetamine (VYVANSE) 50 MG Oral Cap; Take 1 capsule (50 mg total) by mouth daily.  -     lisdexamfetamine (VYVANSE) 50 MG Oral Cap; Take 1 capsule (50 mg total) by mouth daily.  -     lisdexamfetamine (VYVANSE) 50 MG Oral Cap; Take 1 capsule (50 mg total) by mouth daily. Brand or generic ok    Insulin resistance    PCOS (polycystic ovarian syndrome)    Morbid obesity with BMI of 40.0-44.9, adult (HCC)    Depression with anxiety        DYSLIPIDEMIA: Recommend dietary changes and lifestyle modifications as discussed below. Monitor.      Lab Results    Component Value Date/Time    CHOLEST 198 10/25/2023 08:07 AM    LDL 81 10/25/2023 08:07 AM    HDL 85 (H) 10/25/2023 08:07 AM    TRIG 194 (H) 10/25/2023 08:07 AM    VLDL 31 (H) 10/25/2023 08:07 AM     OBESITY/WEIGHT GAIN:     Recommended patient continue intensive lifestyle and behavioral modifications at this time for weight loss.     Reviewed lifestyle modifications: Whole Food/Plant Strong/Low Glycemic Index diet, moderate alcohol consumption, reduced sodium intake to no more than 2,400 mg/day, and at least 150 minutes of moderate physical activity per week.    Avoid processed, poor quality carbohydrates, refined grains, flour, sugar.     Goals for next month:  1. Keep a food log.  2. Drink 64 ounces of non-caloric beverages per day. No fruit juices or regular soda.  3. Aim for 150 minutes moderate exercise per week.    4. Increase fruit and vegetable servings to 5-6 per day.    5. Improve sleep and stress.      Reviewed labs:  7/6/23- a1c 5.9.  10/25/23- a1c 5.6. .   Fasting insulin elevated.      Discussed medication options for weight loss in detail with patient.      Stopped wellbutrin.  Has started Neurofeedback instead.    Continue metformin and restart Vyvanse (50 mg) as ordered.   Has had difficulty filling Vyvanse due to ongoing shortage. Current dose 30 mg in the AM too low.      Lifestyle Medicine Assessment.  Healthy Plate Method.  Daily Inactive Calories: 220-249 lbs: 1800 calories/day.   Aim for 75-85 grams protein/day.  Improve breakfast to whole food, non-processed.     Plan for 30 minute walk once/week until next visit.   Increase to 2 non-starch vegetable servings per week.     RTC 3 months.      MYRNA Ng

## 2024-03-28 ENCOUNTER — OFFICE VISIT (OUTPATIENT)
Dept: DERMATOLOGY CLINIC | Facility: CLINIC | Age: 24
End: 2024-03-28

## 2024-03-28 DIAGNOSIS — L91.0 KELOID SCAR: ICD-10-CM

## 2024-03-28 DIAGNOSIS — Z51.81 MEDICATION MONITORING ENCOUNTER: ICD-10-CM

## 2024-03-28 DIAGNOSIS — L70.0 CYSTIC ACNE VULGARIS: Primary | ICD-10-CM

## 2024-03-28 DIAGNOSIS — L70.9 ACNE, UNSPECIFIED ACNE TYPE: ICD-10-CM

## 2024-03-28 DIAGNOSIS — L40.0 PSORIASIS VULGARIS: ICD-10-CM

## 2024-03-28 DIAGNOSIS — L70.0 ACNE VULGARIS: ICD-10-CM

## 2024-03-28 DIAGNOSIS — E28.2 PCOS (POLYCYSTIC OVARIAN SYNDROME): ICD-10-CM

## 2024-03-28 PROCEDURE — 99214 OFFICE O/P EST MOD 30 MIN: CPT | Performed by: DERMATOLOGY

## 2024-03-28 PROCEDURE — 11900 INJECT SKIN LESIONS </W 7: CPT | Performed by: DERMATOLOGY

## 2024-03-28 RX ORDER — SPIRONOLACTONE 100 MG/1
100 TABLET, FILM COATED ORAL DAILY
Qty: 60 TABLET | Refills: 5 | Status: SHIPPED | OUTPATIENT
Start: 2024-03-28

## 2024-03-28 RX ORDER — CLINDAMYCIN PHOSPHATE 10 UG/ML
1 LOTION TOPICAL 2 TIMES DAILY
Qty: 60 ML | Refills: 6 | Status: SHIPPED | OUTPATIENT
Start: 2024-03-28

## 2024-03-28 NOTE — PROGRESS NOTES
Candace Boyd is a 23 year old female.    Chief Complaint   Patient presents with    Acne     LOV 12/21/23. Patient present for 3 month acne f/u. Notes, there have been a few sporadic breakouts, but unsure if it's due to her diet or something else. Rx clindamycin 1%,external lotion, tretinoin 0.05% external cream and spironolactone 50 Mg oral tabs.             Patient has no known allergies.  Current Outpatient Medications   Medication Sig Dispense Refill    clindamycin 1 % External Lotion Apply 1 Application  topically 2 (two) times daily. Apply thin film to affected area(s). For acne 60 mL 6    spironolactone 100 MG Oral Tab Take 1 tablet (100 mg total) by mouth daily. 60 tablet 5    lisdexamfetamine (VYVANSE) 50 MG Oral Cap Take 1 capsule (50 mg total) by mouth daily. 30 capsule 0    [START ON 4/11/2024] lisdexamfetamine (VYVANSE) 50 MG Oral Cap Take 1 capsule (50 mg total) by mouth daily. 30 capsule 0    [START ON 5/12/2024] lisdexamfetamine (VYVANSE) 50 MG Oral Cap Take 1 capsule (50 mg total) by mouth daily. Brand or generic ok 30 capsule 0    Norethin Ace-Eth Estrad-FE (BLISOVI FE 1.5/30) 1.5-30 MG-MCG Oral Tab Take 1 tablet by mouth daily. 28 tablet 12    Cholecalciferol (VITAMIN D) 50 MCG (2000 UT) Oral Cap Take by mouth.      spironolactone 50 MG Oral Tab Take 3 tablets (150 mg total) by mouth daily. 270 tablet 1    calcipotriene 0.005 % External Cream Apply 1 Application topically 2 (two) times daily. To psoriasis knees and to shoulders 60 g 12    ondansetron 4 MG Oral Tablet Dispersible Take 1 tablet (4 mg total) by mouth every 8 (eight) hours as needed for Nausea.      ketoconazole 2 % External Shampoo Apply 10 mL topically twice a week. 120 mL 2    Tretinoin 0.05 % External Cream Apply a small pea sized amount to areas of acne on the face as directed at bedtime. Use over serums, clindamycin and moistruizers 20 g 6    Mometasone Furoate 0.1 % External Solution Use bid to scalp 60 mL 6     LEVOCETIRIZINE 5 MG Oral Tab TAKE 1 TABLET(5 MG) BY MOUTH EVERY EVENING 90 tablet 1      History reviewed. No pertinent past medical history.   Social History:  Social History     Socioeconomic History    Marital status: Single   Tobacco Use    Smoking status: Never     Passive exposure: Never    Smokeless tobacco: Never   Vaping Use    Vaping Use: Never used   Substance and Sexual Activity    Alcohol use: Not Currently    Drug use: Never    Sexual activity: Never     Birth control/protection: Pill, OCP   Other Topics Concern    Grew up on a farm No    History of tanning Yes    Outdoor occupation No    Breast feeding No    Reaction to local anesthetic No    Pt has a pacemaker No    Pt has a defibrillator No                 Current Outpatient Medications   Medication Sig Dispense Refill    clindamycin 1 % External Lotion Apply 1 Application  topically 2 (two) times daily. Apply thin film to affected area(s). For acne 60 mL 6    spironolactone 100 MG Oral Tab Take 1 tablet (100 mg total) by mouth daily. 60 tablet 5    lisdexamfetamine (VYVANSE) 50 MG Oral Cap Take 1 capsule (50 mg total) by mouth daily. 30 capsule 0    [START ON 4/11/2024] lisdexamfetamine (VYVANSE) 50 MG Oral Cap Take 1 capsule (50 mg total) by mouth daily. 30 capsule 0    [START ON 5/12/2024] lisdexamfetamine (VYVANSE) 50 MG Oral Cap Take 1 capsule (50 mg total) by mouth daily. Brand or generic ok 30 capsule 0    Norethin Ace-Eth Estrad-FE (BLISOVI FE 1.5/30) 1.5-30 MG-MCG Oral Tab Take 1 tablet by mouth daily. 28 tablet 12    Cholecalciferol (VITAMIN D) 50 MCG (2000 UT) Oral Cap Take by mouth.      spironolactone 50 MG Oral Tab Take 3 tablets (150 mg total) by mouth daily. 270 tablet 1    calcipotriene 0.005 % External Cream Apply 1 Application topically 2 (two) times daily. To psoriasis knees and to shoulders 60 g 12    ondansetron 4 MG Oral Tablet Dispersible Take 1 tablet (4 mg total) by mouth every 8 (eight) hours as needed for Nausea.       ketoconazole 2 % External Shampoo Apply 10 mL topically twice a week. 120 mL 2    Tretinoin 0.05 % External Cream Apply a small pea sized amount to areas of acne on the face as directed at bedtime. Use over serums, clindamycin and moistruizers 20 g 6    Mometasone Furoate 0.1 % External Solution Use bid to scalp 60 mL 6    LEVOCETIRIZINE 5 MG Oral Tab TAKE 1 TABLET(5 MG) BY MOUTH EVERY EVENING 90 tablet 1     Allergies:   No Known Allergies    History reviewed. No pertinent past medical history.  History reviewed. No pertinent surgical history.  Social History     Socioeconomic History    Marital status: Single     Spouse name: Not on file    Number of children: Not on file    Years of education: Not on file    Highest education level: Not on file   Occupational History    Not on file   Tobacco Use    Smoking status: Never     Passive exposure: Never    Smokeless tobacco: Never   Vaping Use    Vaping Use: Never used   Substance and Sexual Activity    Alcohol use: Not Currently    Drug use: Never    Sexual activity: Never     Birth control/protection: Pill, OCP   Other Topics Concern    Grew up on a farm No    History of tanning Yes    Outdoor occupation No    Breast feeding No    Reaction to local anesthetic No    Pt has a pacemaker No    Pt has a defibrillator No   Social History Narrative    Not on file     Social Determinants of Health     Financial Resource Strain: Not on file   Food Insecurity: Not on file   Transportation Needs: Not on file   Physical Activity: Not on file   Stress: Not on file   Social Connections: Not on file   Housing Stability: Not on file     Family History   Problem Relation Age of Onset    Diabetes Mother     Cancer Maternal Grandmother         ovarian                      HPI :      Chief Complaint   Patient presents with    Acne     LOV 12/21/23. Patient present for 3 month acne f/u. Notes, there have been a few sporadic breakouts, but unsure if it's due to her diet or something else.  Rx clindamycin 1%,external lotion, tretinoin 0.05% external cream and spironolactone 50 Mg oral tabs.     Patient seeing endocrinology and OB.  PCOS insulin resistance prediabetes on metformin 1500 mg, and spironolactone.  Is been taking the metformin more consistently has significant nausea with this on thousand twice daily milligrams, spironolactone tolerating current dose, has noted improvement in acne with regimen.  Requests extended release metformin due to nausea.  Significant improvement in her acne since adding this and OCP      Use of tretinoin when limited by irritation and dryness    New acne cystic lesions on jawline and neck    Dermographism still problematic.  Xyzal helps.      Dermographism still not controlled.  Patient presents with concerns above.    Past notes/ records and appropriate/relevant lab results including pathology and past body maps reviewed. Updated and new information noted in current visit.       ROS:    Denies any other systemic complaints.  No fevers, chills, night sweats, sensitivity to the sun, deeper lumps or bumps.  No other skin complaints.  History, medications, allergies as noted.    Physical examination: Patient  well-developed well-nourished, alert oriented in no acute distress.  Exam of involved, appropriate areas of skin performed, including scalp, head, neck, face,nails, hair, external eyes, including conjunctival mucosa, eyelids, lips, external ears, back, chest, abdomen, arms, legs, palms.  Remarkable for lesions as noted   Grade 2-3 acne cystic lesions chin jawline, lateral neck.  Chest back shoulders fairly stable no new keloids.  Psoriasis scalp stable, still 3+ dermographism especially legs     ASSESSMENT AND PLAN:     Encounter Diagnoses   Name Primary?    Acne, unspecified acne type     Acne vulgaris     Cystic acne vulgaris Yes    Keloid scar     Psoriasis vulgaris     PCOS (polycystic ovarian syndrome)        Assessment / plan:    Acne. See medications in  grid.  Skin care regimen discussed at length including cleansers, makeup, face washing, sunscreen.  Recheck in 6 -8 weeks if no improvement.  Notify us promptly if problems tolerating regimen.  Consider more aggressive therapy if not responding.  Improving nodular lesions still some active areas over the cheeks chin jawline  Potassium normal  Has been taking spironolactone and metformin   with this and OCP is followed by endocrinology and OB as well insulin resistance PCOS, obesity, dyslipidemia noted.  Associations with psoriasis noted as well  Aldactone should be taken with caution against pregnancy as it can cause male genitourinary defects with pregnancy. Usual side effects --possible more frequent urination, lower blood pressure, possible breast tenderness, irregular menses.    Plan to increase spironolactone to 100 mg twice daily  With increased nodular lesions  Continue topicals as tolerated.    More postinflammatory changes are noted with erythema, some scarring,  Changed to extended release medically necessary  patient is seen bariatric doctor also    Intralesional Kenalog   10 mg/ml total   0.2 mL total injected into lesion or cystic nodules at chin jawline    Labs reviewed elevated testosterone DHEA-S previously noted.  Psoriasis fairly stable continue current regimen add calcipotriene cream twice daily may use this on ears involvement on back shoulders and neck as well area is sensitive to topical steroids uses clobetasol with improvement on the knees incomplete clearing will add the cause of trying to these areas as well continue moisturizers   Psoriasis.  Plaque-type.  10% body surface involvement.  Will treat with medications and grid.  Overall skincare, liberal use of emollients discussed.  Consider more aggressive therapy if worsening.Patient will let us know how they are doing over the next several weeks.  Await clinical response to above therapy.  Recheck in 2-3 months if no improvement.  Continue  to monitor, alternatives discussed.  Overall scalp doing okay presently    Dermographism continue antihistamines using daily twice daily, consider Dupixent  Hydrocortisone lotion twice daily to legs.  We will recheck in 3 to 4 months.  Pathophysiology discussed with patient.  Therapeutic options reviewed.  See  Medications in grid.  Instructions reviewed at length.     General skin care questions answered.   Reassurance regarding benign skin lesions.Signs and symptoms of skin cancer, ABCDE's of melanoma briefly reviewed.  Sunscreen use, sun protection, encouraged.  Followup as noted in rtc or p.r.n.    Encounter Times new patient  Including precharting, reviewing chart, prior notes obtaining history: 10 minutes, medical exam :10 minutes, notes on body map, plan, counseling 10minutes My total time spent caring for the patient on the day of the encounter: 30 minutes     The patient indicates understanding of these issues and agrees to the plan.  The patient is asked to return as noted in follow-up /as noted above    This note was generated using Dragon voice recognition software.  Please contact me regarding any confusion resulting from errors in recognition.  Note to patient and family: The 21st Century Cures Act makes medical notes like these available to patients. However, be advised this is a medical document. It is intended as [id number] communication and monitoring of a patient's care needs. It is written in medical language and may contain abbreviations or verbiage that are unfamiliar. It may appear blunt or direct. Medical documents are intended to carry relevant information, facts as evident and the clinical opinion of the practitioner.  Orders Placed This Encounter   Procedures    INJECTION INTO SKIN LESIONS       Meds & Refills for this Visit:   Requested Prescriptions     Signed Prescriptions Disp Refills    clindamycin 1 % External Lotion 60 mL 6     Sig: Apply 1 Application  topically 2 (two) times  daily. Apply thin film to affected area(s). For acne    spironolactone 100 MG Oral Tab 60 tablet 5     Sig: Take 1 tablet (100 mg total) by mouth daily.       Acne vulgaris  (primary encounter diagnosis)  Psoriasis vulgaris  Keloid scar  Dermatographism  Abnormal menses    Orders Placed This Encounter   Procedures    INJECTION INTO SKIN LESIONS       Results From Past 48 Hours:  No results found for this or any previous visit (from the past 48 hour(s)).    Meds This Visit:      Imaging Orders:  None     Referral Orders:  No orders of the defined types were placed in this encounter.

## 2024-05-11 ENCOUNTER — LAB ENCOUNTER (OUTPATIENT)
Dept: LAB | Age: 24
End: 2024-05-11
Attending: INTERNAL MEDICINE

## 2024-05-11 DIAGNOSIS — E78.5 DYSLIPIDEMIA: ICD-10-CM

## 2024-05-11 DIAGNOSIS — E28.2 PCOS (POLYCYSTIC OVARIAN SYNDROME): ICD-10-CM

## 2024-05-11 DIAGNOSIS — E88.819 INSULIN RESISTANCE: ICD-10-CM

## 2024-05-11 DIAGNOSIS — R73.03 PRE-DIABETES: ICD-10-CM

## 2024-05-11 LAB
ALBUMIN SERPL-MCNC: 4.4 G/DL (ref 3.2–4.8)
ALBUMIN/GLOB SERPL: 1.5 {RATIO} (ref 1–2)
ALP LIVER SERPL-CCNC: 81 U/L
ALT SERPL-CCNC: 26 U/L
ANION GAP SERPL CALC-SCNC: 7 MMOL/L (ref 0–18)
AST SERPL-CCNC: 22 U/L (ref ?–34)
BILIRUB SERPL-MCNC: 0.3 MG/DL (ref 0.3–1.2)
BUN BLD-MCNC: 8 MG/DL (ref 9–23)
BUN/CREAT SERPL: 11.3 (ref 10–20)
CALCIUM BLD-MCNC: 9.6 MG/DL (ref 8.7–10.4)
CHLORIDE SERPL-SCNC: 107 MMOL/L (ref 98–112)
CHOLEST SERPL-MCNC: 223 MG/DL (ref ?–200)
CO2 SERPL-SCNC: 25 MMOL/L (ref 21–32)
CREAT BLD-MCNC: 0.71 MG/DL
EGFRCR SERPLBLD CKD-EPI 2021: 124 ML/MIN/1.73M2 (ref 60–?)
EST. AVERAGE GLUCOSE BLD GHB EST-MCNC: 120 MG/DL (ref 68–126)
FASTING PATIENT LIPID ANSWER: YES
FASTING STATUS PATIENT QL REPORTED: YES
GLOBULIN PLAS-MCNC: 3 G/DL (ref 2–3.5)
GLUCOSE BLD-MCNC: 100 MG/DL (ref 70–99)
HBA1C MFR BLD: 5.8 % (ref ?–5.7)
HDLC SERPL-MCNC: 68 MG/DL (ref 40–59)
INSULIN SERPL-ACNC: 33.7 MU/L (ref 3–25)
LDLC SERPL CALC-MCNC: 111 MG/DL (ref ?–100)
NONHDLC SERPL-MCNC: 155 MG/DL (ref ?–130)
OSMOLALITY SERPL CALC.SUM OF ELEC: 286 MOSM/KG (ref 275–295)
POTASSIUM SERPL-SCNC: 3.8 MMOL/L (ref 3.5–5.1)
PROT SERPL-MCNC: 7.4 G/DL (ref 5.7–8.2)
SODIUM SERPL-SCNC: 139 MMOL/L (ref 136–145)
TRIGL SERPL-MCNC: 261 MG/DL (ref 30–149)
VLDLC SERPL CALC-MCNC: 45 MG/DL (ref 0–30)

## 2024-05-11 PROCEDURE — 83036 HEMOGLOBIN GLYCOSYLATED A1C: CPT

## 2024-05-11 PROCEDURE — 80053 COMPREHEN METABOLIC PANEL: CPT

## 2024-05-11 PROCEDURE — 80061 LIPID PANEL: CPT

## 2024-05-11 PROCEDURE — 83525 ASSAY OF INSULIN: CPT

## 2024-05-11 PROCEDURE — 36415 COLL VENOUS BLD VENIPUNCTURE: CPT

## 2024-05-28 NOTE — PROGRESS NOTES
Follow-up - Reason for Visit:  Possible PCOS.  Requesting Physician: Dr. Carbajal.    CHIEF COMPLAINT:    Chief Complaint   Patient presents with    Polycystic Ovary Syndrome (PCOS)     Follow-up. Labs 5/11/24        HISTORY OF PRESENT ILLNESS:   Candace Boyd is a 23 year old female who presents with PCOS for follow-up.     She has acne, increased hair growth and oligo-ovulatory cycles.     She is currently taking metformin ER 1g PO qAM and has not been able to increase past this due to the fact that she forgets to take the evening dose.     She is doing well on the spironolactone (100mg PO qday) and the addition of the combined OCP (lo-estrin Lo), noting some improvement in acne as well as softening and decreased hair growth. Her cycle are still not regular.     She has a binge-eating disorder, anxiety, depression, ADHD since she was a child. She has just been able to come to the doctor to have this treated.     She has pre-diabetes, and the diabetes runs in her family. She has always wanted to try and lose weight, but has not been successful with this.     At the last visit, she had told me that she was not feeling well on the current OCPs, so I prescribed her a different type of OCPs.       PAST MEDICAL HISTORY:   No past medical history on file.    PAST SURGICAL HISTORY:   No past surgical history on file.    SOCIAL HISTORY:    Social History     Socioeconomic History    Marital status: Single   Tobacco Use    Smoking status: Never     Passive exposure: Never    Smokeless tobacco: Never   Vaping Use    Vaping status: Never Used   Substance and Sexual Activity    Alcohol use: Not Currently    Drug use: Never    Sexual activity: Never     Birth control/protection: Pill, OCP   Other Topics Concern    Grew up on a farm No    History of tanning Yes    Outdoor occupation No    Breast feeding No    Reaction to local anesthetic No    Pt has a pacemaker No    Pt has a defibrillator No     Social Determinants of Health       Received from [facility], [facility]    Housing Stability       FAMILY HISTORY:   Family History   Problem Relation Age of Onset    Diabetes Mother     Cancer Maternal Grandmother         ovarian       CURRENT MEDICATIONS:    Current Outpatient Medications   Medication Sig Dispense Refill    spironolactone 50 MG Oral Tab Take 3 tablets (150 mg total) by mouth daily. 270 tablet 1    clindamycin 1 % External Lotion Apply 1 Application  topically 2 (two) times daily. Apply thin film to affected area(s). For acne 60 mL 6    spironolactone 100 MG Oral Tab Take 1 tablet (100 mg total) by mouth daily. 60 tablet 5    lisdexamfetamine (VYVANSE) 50 MG Oral Cap Take 1 capsule (50 mg total) by mouth daily. Brand or generic ok 30 capsule 0    Norethin Ace-Eth Estrad-FE (BLISOVI FE 1.5/30) 1.5-30 MG-MCG Oral Tab Take 1 tablet by mouth daily. 28 tablet 12    Cholecalciferol (VITAMIN D) 50 MCG (2000 UT) Oral Cap Take by mouth.      calcipotriene 0.005 % External Cream Apply 1 Application topically 2 (two) times daily. To psoriasis knees and to shoulders 60 g 12    ondansetron 4 MG Oral Tablet Dispersible Take 1 tablet (4 mg total) by mouth every 8 (eight) hours as needed for Nausea.      ketoconazole 2 % External Shampoo Apply 10 mL topically twice a week. 120 mL 2    Tretinoin 0.05 % External Cream Apply a small pea sized amount to areas of acne on the face as directed at bedtime. Use over serums, clindamycin and moistruizers 20 g 6    Mometasone Furoate 0.1 % External Solution Use bid to scalp 60 mL 6    LEVOCETIRIZINE 5 MG Oral Tab TAKE 1 TABLET(5 MG) BY MOUTH EVERY EVENING 90 tablet 1       ALLERGIES:  No Known Allergies      ASSESSMENTS:     REVIEW OF SYSTEMS:  Constitutional: Negative for: weight change, fever, fatigue, cold/heat intolerance  Eyes: Negative for:  Visual changes, proptosis, blurring  ENT: Negative for:  dysphagia, neck swelling, dysphonia  Respiratory: Negative for:   dyspnea, cough  Cardiovascular: Negative for:  chest pain, palpitations, orthopnea  GI: Negative for:  abdominal pain, nausea, vomiting, diarrhea, constipation, bleeding  Neurology: Negative for: headache, numbness, weakness  Genito-Urinary: Negative for: dysuria, frequency  Psychiatric: Negative for:  depression, anxiety  Hematology/Lymphatics: Negative for: bruising, lower extremity edema  Endocrine: Negative for: polyuria, polydypsia  Skin: Negative for: rash, blister, cellulitis,      PHYSICAL EXAM:   Vitals:    05/29/24 1600   BP: 133/83   Pulse: 95   Weight: 236 lb 12.8 oz (107.4 kg)   Height: 5' 5\" (1.651 m)         BMI: Body mass index is 39.41 kg/m².     CONSTITUTIONAL:  awake, alert, cooperative, no apparent distress, and appears stated age  PSYCH: normal affect  EYES:  No proptosis, no ptosis, conjunctiva normal  SKIN:  no bruising or bleeding, no rashes and no lesions  EXTREMITIES: no edema      DATA:   Reviewed    ASSESSMENT AND PLAN: This is a 23 year-old woman here for follow-up of management of PCOS that has been diagnosed based upon oligo-ovulatory cycles, and biochemical as well as clinical hyperandrogenism. I would like to increase her metformin ER to 2000mg. She should also start vitamin D 2,000 international units qday. I would also like to change her OCPs as well.     She will follow up with me in 6 months with new blood work.     Return to clinic in 6 months      Prior to this encounter, I spent over 15 minutes with preparing for the visit, including reviewing documents from other specialties as well as from PCP and going over test results. During the face to face encounter, I spent an additional 15 minutes which were determined for follow-up. Greater than 50% of the time was spent in counseling, anticipatory guidance, and coordination of care. Patient concerns were answered to the best of my knowledge.         5/29/24  Martha Mattson MD

## 2024-05-29 ENCOUNTER — OFFICE VISIT (OUTPATIENT)
Dept: ENDOCRINOLOGY CLINIC | Facility: CLINIC | Age: 24
End: 2024-05-29

## 2024-05-29 VITALS
BODY MASS INDEX: 39.46 KG/M2 | HEIGHT: 65 IN | WEIGHT: 236.81 LBS | HEART RATE: 95 BPM | SYSTOLIC BLOOD PRESSURE: 133 MMHG | DIASTOLIC BLOOD PRESSURE: 83 MMHG

## 2024-05-29 DIAGNOSIS — E78.5 DYSLIPIDEMIA: ICD-10-CM

## 2024-05-29 DIAGNOSIS — R73.03 PRE-DIABETES: ICD-10-CM

## 2024-05-29 DIAGNOSIS — E28.2 PCOS (POLYCYSTIC OVARIAN SYNDROME): Primary | ICD-10-CM

## 2024-05-29 DIAGNOSIS — E88.819 INSULIN RESISTANCE: ICD-10-CM

## 2024-05-29 PROCEDURE — 99214 OFFICE O/P EST MOD 30 MIN: CPT | Performed by: INTERNAL MEDICINE

## 2024-05-30 ENCOUNTER — OFFICE VISIT (OUTPATIENT)
Dept: DERMATOLOGY CLINIC | Facility: CLINIC | Age: 24
End: 2024-05-30

## 2024-05-30 DIAGNOSIS — L91.0 KELOID SCAR: ICD-10-CM

## 2024-05-30 DIAGNOSIS — L40.0 PSORIASIS VULGARIS: ICD-10-CM

## 2024-05-30 DIAGNOSIS — Z51.81 MEDICATION MONITORING ENCOUNTER: ICD-10-CM

## 2024-05-30 DIAGNOSIS — L70.0 ACNE VULGARIS: Primary | ICD-10-CM

## 2024-05-30 DIAGNOSIS — L50.3 DERMATOGRAPHISM: ICD-10-CM

## 2024-05-30 DIAGNOSIS — L73.2 HIDRADENITIS SUPPURATIVA: ICD-10-CM

## 2024-05-30 PROCEDURE — 99215 OFFICE O/P EST HI 40 MIN: CPT | Performed by: DERMATOLOGY

## 2024-05-30 RX ORDER — CETIRIZINE HYDROCHLORIDE 10 MG/1
10 TABLET ORAL 2 TIMES DAILY
Qty: 60 TABLET | Refills: 3 | Status: SHIPPED | OUTPATIENT
Start: 2024-05-30

## 2024-05-30 RX ORDER — NORETHINDRONE ACETATE AND ETHINYL ESTRADIOL 1MG-20(21)
1 KIT ORAL DAILY
COMMUNITY
Start: 2024-04-02

## 2024-05-30 RX ORDER — KETOCONAZOLE 20 MG/G
CREAM TOPICAL
Qty: 60 G | Refills: 2 | Status: SHIPPED | OUTPATIENT
Start: 2024-05-30

## 2024-06-09 NOTE — PROGRESS NOTES
Candace Boyd is a 23 year old female.    Chief Complaint   Patient presents with    Acne     LOV 3/2024.  Pt presents for acne f/u- slowly seeing improvement.  Recent flare with ingestion of dairy products.   RX spironolactone and clindamycin lotion              Patient has no known allergies.  Current Outpatient Medications   Medication Sig Dispense Refill    BLISOVI FE 1/20 1-20 MG-MCG Oral Tab Take 1 tablet by mouth daily.      cetirizine 10 MG Oral Tab Take 1 tablet (10 mg total) by mouth 2 (two) times daily. 60 tablet 3    metFORMIN HCl 1000 MG Oral Tab Take 1 tablet (1,000 mg total) by mouth 2 (two) times daily with meals.      ketoconazole 2 % External Cream Apply to affected area 2 times daily. 60 g 2    clindamycin 1 % External Lotion Apply 1 Application  topically 2 (two) times daily. Apply thin film to affected area(s). For acne 60 mL 6    spironolactone 100 MG Oral Tab Take 1 tablet (100 mg total) by mouth daily. 60 tablet 5    lisdexamfetamine (VYVANSE) 50 MG Oral Cap Take 1 capsule (50 mg total) by mouth daily. Brand or generic ok 30 capsule 0    Norethin Ace-Eth Estrad-FE (BLISOVI FE 1.5/30) 1.5-30 MG-MCG Oral Tab Take 1 tablet by mouth daily. 28 tablet 12    Cholecalciferol (VITAMIN D) 50 MCG (2000 UT) Oral Cap Take by mouth.      spironolactone 50 MG Oral Tab Take 3 tablets (150 mg total) by mouth daily. 270 tablet 1    calcipotriene 0.005 % External Cream Apply 1 Application topically 2 (two) times daily. To psoriasis knees and to shoulders 60 g 12    ondansetron 4 MG Oral Tablet Dispersible Take 1 tablet (4 mg total) by mouth every 8 (eight) hours as needed for Nausea.      ketoconazole 2 % External Shampoo Apply 10 mL topically twice a week. 120 mL 2    Tretinoin 0.05 % External Cream Apply a small pea sized amount to areas of acne on the face as directed at bedtime. Use over serums, clindamycin and moistruizers 20 g 6    Mometasone Furoate 0.1 % External Solution Use bid to scalp 60 mL 6     LEVOCETIRIZINE 5 MG Oral Tab TAKE 1 TABLET(5 MG) BY MOUTH EVERY EVENING 90 tablet 1      History reviewed. No pertinent past medical history.   Social History:  Social History     Socioeconomic History    Marital status: Single   Tobacco Use    Smoking status: Never     Passive exposure: Never    Smokeless tobacco: Never   Vaping Use    Vaping status: Never Used   Substance and Sexual Activity    Alcohol use: Not Currently    Drug use: Never    Sexual activity: Never     Birth control/protection: Pill, OCP   Other Topics Concern    Grew up on a farm No    History of tanning Yes    Outdoor occupation No    Breast feeding No    Reaction to local anesthetic No    Pt has a pacemaker No    Pt has a defibrillator No     Social Determinants of Health      Received from [facility], [facility]    Housing Stability                 Current Outpatient Medications   Medication Sig Dispense Refill    BLISOVI FE 1/20 1-20 MG-MCG Oral Tab Take 1 tablet by mouth daily.      cetirizine 10 MG Oral Tab Take 1 tablet (10 mg total) by mouth 2 (two) times daily. 60 tablet 3    metFORMIN HCl 1000 MG Oral Tab Take 1 tablet (1,000 mg total) by mouth 2 (two) times daily with meals.      ketoconazole 2 % External Cream Apply to affected area 2 times daily. 60 g 2    clindamycin 1 % External Lotion Apply 1 Application  topically 2 (two) times daily. Apply thin film to affected area(s). For acne 60 mL 6    spironolactone 100 MG Oral Tab Take 1 tablet (100 mg total) by mouth daily. 60 tablet 5    lisdexamfetamine (VYVANSE) 50 MG Oral Cap Take 1 capsule (50 mg total) by mouth daily. Brand or generic ok 30 capsule 0    Norethin Ace-Eth Estrad-FE (BLISOVI FE 1.5/30) 1.5-30 MG-MCG Oral Tab Take 1 tablet by mouth daily. 28 tablet 12    Cholecalciferol (VITAMIN D) 50 MCG (2000 UT) Oral Cap Take by mouth.      spironolactone 50 MG Oral Tab Take 3 tablets (150 mg total) by mouth daily. 270 tablet 1    calcipotriene  0.005 % External Cream Apply 1 Application topically 2 (two) times daily. To psoriasis knees and to shoulders 60 g 12    ondansetron 4 MG Oral Tablet Dispersible Take 1 tablet (4 mg total) by mouth every 8 (eight) hours as needed for Nausea.      ketoconazole 2 % External Shampoo Apply 10 mL topically twice a week. 120 mL 2    Tretinoin 0.05 % External Cream Apply a small pea sized amount to areas of acne on the face as directed at bedtime. Use over serums, clindamycin and moistruizers 20 g 6    Mometasone Furoate 0.1 % External Solution Use bid to scalp 60 mL 6    LEVOCETIRIZINE 5 MG Oral Tab TAKE 1 TABLET(5 MG) BY MOUTH EVERY EVENING 90 tablet 1     Allergies:   No Known Allergies    History reviewed. No pertinent past medical history.  History reviewed. No pertinent surgical history.  Social History     Socioeconomic History    Marital status: Single     Spouse name: Not on file    Number of children: Not on file    Years of education: Not on file    Highest education level: Not on file   Occupational History    Not on file   Tobacco Use    Smoking status: Never     Passive exposure: Never    Smokeless tobacco: Never   Vaping Use    Vaping status: Never Used   Substance and Sexual Activity    Alcohol use: Not Currently    Drug use: Never    Sexual activity: Never     Birth control/protection: Pill, OCP   Other Topics Concern    Grew up on a farm No    History of tanning Yes    Outdoor occupation No    Breast feeding No    Reaction to local anesthetic No    Pt has a pacemaker No    Pt has a defibrillator No   Social History Narrative    Not on file     Social Determinants of Health     Financial Resource Strain: Not on file   Food Insecurity: Not on file   Transportation Needs: Not on file   Physical Activity: Not on file   Stress: Not on file   Social Connections: Not on file   Housing Stability: Low Risk  (7/3/2023)    Received from [facility], [facility]    Housing  Stability     Mortgage Payment Concerns?: Not on file     Number of Places Lived in the Last Year: Not on file     Unstable Housing?: Not on file     Family History   Problem Relation Age of Onset    Diabetes Mother     Cancer Maternal Grandmother         ovarian                      HPI :      Chief Complaint   Patient presents with    Acne     LOV 3/2024.  Pt presents for acne f/u- slowly seeing improvement.  Recent flare with ingestion of dairy products.   RX spironolactone and clindamycin lotion      Follow-up patient with numerous skin complaints  History of acne, psoriasis.    Notes vulvar lesion inflamed irritated has been coming and going for many years.  Occasional irritation at axillary  Patient seeing endocrinology and OB.  PCOS insulin resistance prediabetes on metformin 1500 mg, and spironolactone.  Is been taking the metformin more consistently has significant nausea with this on thousand twice daily milligrams, spironolactone tolerating current dose, has noted improvement in acne with regimen.  Requests extended release metformin due to nausea.  Significant improvement in her acne since adding this and OCP      Use of tretinoin when limited by irritation and dryness    New acne cystic lesions on jawline and neck    Dermographism still problematic.  Xyzal helps.      Dermographism still not controlled.  Patient presents with concerns above.    Past notes/ records and appropriate/relevant lab results including pathology and past body maps reviewed. Updated and new information noted in current visit.       ROS:    Denies any other systemic complaints.  No fevers, chills, night sweats, sensitivity to the sun, deeper lumps or bumps.  No other skin complaints.  History, medications, allergies as noted.    Physical examination: Patient  well-developed well-nourished, alert oriented in no acute distress.  Exam of involved, appropriate areas of skin performed, including scalp, head, neck, face,nails, hair,  external eyes, including conjunctival mucosa, eyelids, lips, external ears, back, chest, abdomen, arms, legs, palms.  Remarkable for lesions as noted   Grade2 acne cystic lesions chin jawline, lateral neck.  Chest back shoulders fairly stable no new keloids.  Psoriasis scalp stable,   Resolving lesions over the groin, vulvar area inflamed irritated no obvious double comedones.  Psoriasiform patches at axillary  ASSESSMENT AND PLAN:     Encounter Diagnoses   Name Primary?    Acne vulgaris Yes    Keloid scar     Psoriasis vulgaris     Medication monitoring encounter     Dermatographism     Hidradenitis suppurativa        Assessment / plan:    Acne. See medications in grid.  Skin care regimen discussed at length including cleansers, makeup, face washing, sunscreen.  Recheck in 6 -8 weeks if no improvement.  Notify us promptly if problems tolerating regimen.  Consider more aggressive therapy if not responding.  Improving nodular lesions still some active areas over the cheeks chin jaw line  New cystic lesions along the perioral cheeks and jawline with intralesional Kenalog 10 mg 10mg/0.4 mL.  Continue topical and oral medications  Continue her current regimen overall improved patient notes diet absolutely triggers more inflamed cystic lesions.  Not interested in isotretinoin at this time  Potassium normal  Has been taking spironolactone and metformin   with this and OCP is followed by endocrinology and OB as well insulin resistance PCOS, obesity, dyslipidemia noted.  Associations with psoriasis noted as well  Aldactone should be taken with caution against pregnancy as it can cause male genitourinary defects with pregnancy. Usual side effects --possible more frequent urination, lower blood pressure, possible breast tenderness, irregular menses.    Continue spironolactone to 100 mg twice daily  Continue topicals as tolerated.  Patient using multiple over-the-counter products as well    More postinflammatory changes are  noted with erythema, some scarring,    Keloids continue be a concern.  Discussed adding antihistamine as per [facility] regimen.  Labs reviewed elevated testosterone DHEA-S previously noted.  Psoriasis fairly stable continue current regimen add calcipotriene cream twice daily may use this on ears involvement on back shoulders and neck as well area is sensitive to topical steroids uses clobetasol with improvement on the knees incomplete clearing will add the cause of trying to these areas as well continue moisturizers      Vulvar lesions consistent with hidradenitis the fact this frequently associated with acne, psoriasis, PCOS reviewed.  Dietary management helpful spironolactone and topicals should be helpful as well  Hidradenitis suppurativa.  Chronic recurrent nature of this discussed.  Chronic condition with exacerbations.  Patient with old lesions postinflammatory changes as well as new active lesions.  Management including topical oral antibiotics, local care discussed.  Long-term need for management reviewed.  The fact this can be associated with other endocrine problems including PCO S discussed.  Newer therapies including Humira discussed.  Surgical management of chronic sinus tracts reviewed other topical therapies, local management discussed.    Discussed possible systemic medications patient not sure she wants to proceed with this at this time oral antibiotics will add ketoconazole cream to areas at axillary also   Psoriasis.  Plaque-type.  10% body surface involvement.  Will treat with medications and grid.  Overall skincare, liberal use of emollients discussed.  Consider more aggressive therapy if worsening.Patient will let us know how they are doing over the next several weeks.  Await clinical response to above therapy.  Recheck in 2-3 months if no improvement.  Continue to monitor, alternatives discussed.  Overall scalp doing okay presently    Dermographism continue antihistamines using  daily twice daily, consider Dupixent  Hydrocortisone lotion twice daily to legs.  We will recheck in 3 to 4 months.  Pathophysiology discussed with patient.  Therapeutic options reviewed.  See  Medications in grid.  Instructions reviewed at length.     General skin care questions answered.   Reassurance regarding benign skin lesions.Signs and symptoms of skin cancer, ABCDE's of melanoma briefly reviewed.  Sunscreen use, sun protection, encouraged.  Followup as noted in rtc or p.r.n.    Encounter Times new patient  Including precharting, reviewing chart, prior notes obtaining history: 10 minutes, medical exam :10 minutes, notes on body map, plan, counseling 10minutes My total time spent caring for the patient on the day of the encounter: 30 minutes     The patient indicates understanding of these issues and agrees to the plan.  The patient is asked to return as noted in follow-up /as noted above    This note was generated using Dragon voice recognition software.  Please contact me regarding any confusion resulting from errors in recognition.  Note to patient and family: The 21st Century Cures Act makes medical notes like these available to patients. However, be advised this is a medical document. It is intended as [id number] communication and monitoring of a patient's care needs. It is written in medical language and may contain abbreviations or verbiage that are unfamiliar. It may appear blunt or direct. Medical documents are intended to carry relevant information, facts as evident and the clinical opinion of the practitioner.  No orders of the defined types were placed in this encounter.      Meds & Refills for this Visit:   Requested Prescriptions     Signed Prescriptions Disp Refills    cetirizine 10 MG Oral Tab 60 tablet 3     Sig: Take 1 tablet (10 mg total) by mouth 2 (two) times daily.    ketoconazole 2 % External Cream 60 g 2     Sig: Apply to affected area 2 times daily.       Acne vulgaris  (primary  encounter diagnosis)  Psoriasis vulgaris  Keloid scar  Dermatographism  Abnormal menses    No orders of the defined types were placed in this encounter.      Results From Past 48 Hours:  No results found for this or any previous visit (from the past 48 hour(s)).    Meds This Visit:      Imaging Orders:  None     Referral Orders:  No orders of the defined types were placed in this encounter.

## 2024-07-15 ENCOUNTER — OFFICE VISIT (OUTPATIENT)
Dept: SURGERY | Facility: CLINIC | Age: 24
End: 2024-07-15
Payer: MEDICAID

## 2024-07-15 VITALS
HEIGHT: 64.2 IN | BODY MASS INDEX: 40.29 KG/M2 | SYSTOLIC BLOOD PRESSURE: 132 MMHG | OXYGEN SATURATION: 99 % | DIASTOLIC BLOOD PRESSURE: 80 MMHG | HEART RATE: 108 BPM | WEIGHT: 236 LBS

## 2024-07-15 DIAGNOSIS — E28.2 PCOS (POLYCYSTIC OVARIAN SYNDROME): ICD-10-CM

## 2024-07-15 DIAGNOSIS — F41.8 DEPRESSION WITH ANXIETY: ICD-10-CM

## 2024-07-15 DIAGNOSIS — R63.2 BINGE EATING: ICD-10-CM

## 2024-07-15 DIAGNOSIS — E66.01 MORBID OBESITY WITH BMI OF 40.0-44.9, ADULT (HCC): ICD-10-CM

## 2024-07-15 DIAGNOSIS — E88.819 INSULIN RESISTANCE: ICD-10-CM

## 2024-07-15 DIAGNOSIS — E78.5 DYSLIPIDEMIA: Primary | ICD-10-CM

## 2024-07-15 PROCEDURE — 99213 OFFICE O/P EST LOW 20 MIN: CPT | Performed by: NURSE PRACTITIONER

## 2024-07-15 NOTE — PROGRESS NOTES
[facility], [place]  [address]  [facility] [address]  Dept: [phone]       Patient:  Candace Boyd  :      2000  MRN:      [id number]    Chief Complaint:    Chief Complaint   Patient presents with    Follow - Up    Weight Management    Obesity       SUBJECTIVE     History of Present Illness:  Candace is being seen today for a follow-up for weight management.    Online school.    Initial HPI:  24 yo female.   Presents to clinic for assistance with weight loss/maintenance.   Reports first noticing weight gain during puberty.   Hx PCOS.   Describes nausea regularly.   Recently started Vyvanse- has lost close to 10 lbs. Also takes/tolerating metformin.     Patient is considering medications and is not considering bariatric surgery for weight loss.    Patient has history of eating disorder(s).   Binge Eating.    Patient is in college, works a student job.  Patient lives with dad, Theresa (grandma).    Patient's goal weight:   Biggest weight loss in the past: minimal   How weight loss was achieved: exercise   Heaviest weight ever:  Previous use of medical weight loss medications:    Physical activity:   Recently stopped    Sleep: 5-10 hours/night    Sleep screening:       Past Medical History: History reviewed. No pertinent past medical history.     Comorbidities:  Hyperlipidemia-Improvement?  yes    OBJECTIVE     Vitals: /80 (BP Location: Right arm, Patient Position: Sitting, Cuff Size: adult)   Pulse 108   Ht 5' 4.2\" (1.631 m)   Wt 236 lb (107 kg)   LMP 2023 (Approximate)   SpO2 99%   BMI 40.26 kg/m²     Initial weight loss: -04   Total weight loss: +02   Start weight: 234    Wt Readings from Last 6 Encounters:   07/15/24 236 lb (107 kg)   24 236 lb 12.8 oz (107.4 kg)   24 240 lb (108.9 kg)   24 235 lb (106.6 kg)   24 231 lb (104.8 kg)   24 230 lb (104.3 kg)       Patient Medications:     Current Outpatient Medications   Medication Sig Dispense Refill    BLISOVI FE 1/20 1-20 MG-MCG Oral Tab Take 1 tablet by mouth daily.      cetirizine 10 MG Oral Tab Take 1 tablet (10 mg total) by mouth 2 (two) times daily. 60 tablet 3    metFORMIN HCl 1000 MG Oral Tab Take 1 tablet (1,000 mg total) by mouth 2 (two) times daily with meals.      ketoconazole 2 % External Cream Apply to affected area 2 times daily. 60 g 2    clindamycin 1 % External Lotion Apply 1 Application  topically 2 (two) times daily. Apply thin film to affected area(s). For acne 60 mL 6    spironolactone 100 MG Oral Tab Take 1 tablet (100 mg total) by mouth daily. 60 tablet 5    Norethin Ace-Eth Estrad-FE (BLISOVI FE 1.5/30) 1.5-30 MG-MCG Oral Tab Take 1 tablet by mouth daily. 28 tablet 12    Cholecalciferol (VITAMIN D) 50 MCG (2000 UT) Oral Cap Take by mouth.      spironolactone 50 MG Oral Tab Take 3 tablets (150 mg total) by mouth daily. 270 tablet 1    calcipotriene 0.005 % External Cream Apply 1 Application topically 2 (two) times daily. To psoriasis knees and to shoulders 60 g 12    ondansetron 4 MG Oral Tablet Dispersible Take 1 tablet (4 mg total) by mouth every 8 (eight) hours as needed for Nausea.      ketoconazole 2 % External Shampoo Apply 10 mL topically twice a week. 120 mL 2    Tretinoin 0.05 % External Cream Apply a small pea sized amount to areas of acne on the face as directed at bedtime. Use over serums, clindamycin and moistruizers 20 g 6    Mometasone Furoate 0.1 % External Solution Use bid to scalp 60 mL 6    LEVOCETIRIZINE 5 MG Oral Tab TAKE 1 TABLET(5 MG) BY MOUTH EVERY EVENING 90 tablet 1     Allergies:  Patient has no known allergies.     Social History: Reviewed     Surgical History:  History reviewed. No pertinent surgical history.  Family History:    Family History   Problem Relation Age of Onset    Diabetes Mother     Cancer Maternal Grandmother         ovarian     Initial Intake:  After dinner behavior: -  Night  eating: -  Portion sizes: +  Binge: +  Emotional: +  Depression: Score: 17  Grazing:   Sweet tooth:  Crunchy/salty:  Etoh: rare binge drink episodes since turning age 23  Drinks water, coffee  Soda Drinker: Yes                 If yes, how much?:  Rare  Sports Drinks:  No                  Juice:  No                     Number of restaurant or fast food meals/week:  meals/week    Food Journal  Reviewed and Discussed:       Patient has a Food Journal?: yes more awareness    Patient is reading nutrition labels?  yes  Average Caloric Intake:   1800  Average CHO Intake:   Is patient exercising? no  Type of exercise?     Eating Habits  Patient states the following:  Eats 3 meal(s) per day  Length of time it takes to consume a meal:    # of snacks per day:    Type of snacks:    Amount of soda consumption per day:    Amount of water (in ounces) per day:    Toughest challenge: food, exercise, dairy free ice cream    Less sleep over the summer    B: oatmeal, protein powder; smoothie, protein powder; frozen burrito  fruit (berries, bananas, apples, oranges, nuts; protein shake    L: Veggie salad/peppers, cucumbers, ACV, seasonings   D: Take out, Theresa's cooking     Nutritional Goals  Eat 3-4 cups of fresh fruits or vegetables daily    Behavior Modifications Reviewed and Discussed  Eat breakfast, Eat 3 meals per day, Plan meals in advance, Read nutrition labels, Drink 64 oz of water per day, Maintain a daily food journal, Utlize portion control strategies to reduce calorie intake, Identify triggers for eating and manage cues, and Eat slowly and take 20 to 30 minutes to complete each meal    Exercise Goals Reviewed and Discussed    Aim for 150 minutes moderate level exercise weekly with 2-3 days strength training as tolerated    ROS:    Constitutional: positive for fatigue  Respiratory: negative  Cardiovascular: negative  Gastrointestinal: negative  Integument/breast: positive for acne   Hematologic/lymphatic:  negative  Musculoskeletal:positive for arthralgias  Neurological: negative  Behavioral/Psych: positive for anxiety and depression  Endocrine: negative  All other systems were reviewed and are negative    Physical Exam:  General appearance: alert, appears stated age, cooperative and obese  Head: Normocephalic, without obvious abnormality, atraumatic  Neck: no adenopathy, no carotid bruit, no JVD, supple, symmetrical, trachea midline and thyroid not enlarged, symmetric, no tenderness/mass/nodules  Lungs: clear to auscultation bilaterally  Heart: S1, S2 normal, no murmur, click, rub or gallop, regular rate and rhythm  Abdomen: soft, non-tender; bowel sounds normal; no masses,  no organomegaly and abdomen obese   Extremities: intact, no edema   Pulses: 2+ and symmetric  Skin: intact   Neurologic: Grossly normal       ASSESSMENT       Encounter Diagnosis(ses):   Encounter Diagnoses   Name Primary?    Dyslipidemia Yes    Binge eating     Morbid obesity with BMI of 40.0-44.9, adult (HCC)     Depression with anxiety     Insulin resistance     PCOS (polycystic ovarian syndrome)            PLAN         Diagnoses and all orders for this visit:    Dyslipidemia    Binge eating    Morbid obesity with BMI of 40.0-44.9, adult (HCC)    Depression with anxiety    Insulin resistance    PCOS (polycystic ovarian syndrome)          DYSLIPIDEMIA: Recommend dietary changes and lifestyle modifications as discussed below. Monitor.      Lab Results   Component Value Date/Time    CHOLEST 223 (H) 05/11/2024 09:16 AM     (H) 05/11/2024 09:16 AM    HDL 68 (H) 05/11/2024 09:16 AM    TRIG 261 (H) 05/11/2024 09:16 AM    VLDL 45 (H) 05/11/2024 09:16 AM     OBESITY/WEIGHT GAIN:     Recommended patient continue intensive lifestyle and behavioral modifications at this time for weight loss.     Reviewed lifestyle modifications: Whole Food/Plant Strong/Low Glycemic Index diet, moderate alcohol consumption, reduced sodium intake to no more than  2,400 mg/day, and at least 150 minutes of moderate physical activity per week.    Avoid processed, poor quality carbohydrates, refined grains, flour, sugar.     Goals for next month:  1. Keep a food log.  2. Drink 64 ounces of non-caloric beverages per day. No fruit juices or regular soda.  3. Aim for 150 minutes moderate exercise per week.    4. Increase fruit and vegetable servings to 5-6 per day.    5. Improve sleep and stress.      Reviewed labs:  7/6/23- a1c 5.9.  10/25/23- a1c 5.6. .   Fasting insulin elevated.      Discussed medication options for weight loss in detail with patient.      Stopped wellbutrin.  Has started Neurofeedback instead.    Continue metformin (ordered by lennie).    Last dose of Vyanse May 2024.  Holding off on Vyvanse- too stimulating.      Lifestyle Medicine Assessment.  Healthy Plate Method.  Daily Inactive Calories: 220-249 lbs: 1800 calories/day.   Aim for 75-85 grams protein/day.  Improve breakfast to whole food, non-processed.     Plan for 30 minute walk once/week until next visit.   Increase to 2 non-starch vegetable servings per week.     RTC 3-4 months.      MYRNA Ng

## 2024-07-25 ENCOUNTER — OFFICE VISIT (OUTPATIENT)
Dept: DERMATOLOGY CLINIC | Facility: CLINIC | Age: 24
End: 2024-07-25

## 2024-07-25 DIAGNOSIS — L70.0 ACNE VULGARIS: Primary | ICD-10-CM

## 2024-07-25 DIAGNOSIS — L91.0 KELOID SCAR: ICD-10-CM

## 2024-07-25 DIAGNOSIS — L40.0 PSORIASIS VULGARIS: ICD-10-CM

## 2024-07-25 DIAGNOSIS — E28.2 PCOS (POLYCYSTIC OVARIAN SYNDROME): ICD-10-CM

## 2024-07-25 DIAGNOSIS — L70.0 CYSTIC ACNE VULGARIS: ICD-10-CM

## 2024-07-25 DIAGNOSIS — L50.3 DERMATOGRAPHISM: ICD-10-CM

## 2024-07-25 DIAGNOSIS — L21.9 SEBORRHEIC DERMATITIS: ICD-10-CM

## 2024-07-25 DIAGNOSIS — L70.9 ACNE, UNSPECIFIED ACNE TYPE: ICD-10-CM

## 2024-07-25 DIAGNOSIS — Z51.81 MEDICATION MONITORING ENCOUNTER: ICD-10-CM

## 2024-07-25 PROCEDURE — 99214 OFFICE O/P EST MOD 30 MIN: CPT | Performed by: DERMATOLOGY

## 2024-07-25 RX ORDER — MOMETASONE FUROATE 1 MG/ML
SOLUTION TOPICAL
Qty: 60 ML | Refills: 6 | Status: SHIPPED | OUTPATIENT
Start: 2024-07-25

## 2024-07-25 RX ORDER — KETOCONAZOLE 20 MG/ML
SHAMPOO TOPICAL
Qty: 120 ML | Refills: 11 | Status: SHIPPED | OUTPATIENT
Start: 2024-07-25

## 2024-07-25 RX ORDER — SPIRONOLACTONE 50 MG/1
150 TABLET, FILM COATED ORAL DAILY
Qty: 270 TABLET | Refills: 1 | Status: SHIPPED | OUTPATIENT
Start: 2024-07-25

## 2024-07-25 RX ORDER — HYDROCORTISONE 25 MG/ML
LOTION TOPICAL
Qty: 120 ML | Refills: 5 | Status: SHIPPED | OUTPATIENT
Start: 2024-07-25

## 2024-08-04 NOTE — PROGRESS NOTES
Candace Boyd is a 23 year old female.    Chief Complaint   Patient presents with    Acne     LOV 5/30/24. Pt present for 8 wk acne vulgaris f/u. Notes, there has been some improvement. Rx spironolactone 50 MG Oral Tab. Pt requesting refill of Mometasone Furoate 0.1 % External Solution for her scalp, and hydrocortisone 2.5 % lotion for her legs.             Patient has no known allergies.  Current Outpatient Medications   Medication Sig Dispense Refill    ketoconazole 2 % External Shampoo Use 2 times weekly to shampoo for psoriasis 120 mL 11    Mometasone Furoate 0.1 % External Solution Use bid to scalp 60 mL 6    Hydrocortisone 2.5 % External Lotion Apply bid to legs 120 mL 5    spironolactone 50 MG Oral Tab Take 3 tablets (150 mg total) by mouth daily. 270 tablet 1    BLISOVI FE 1/20 1-20 MG-MCG Oral Tab Take 1 tablet by mouth daily.      cetirizine 10 MG Oral Tab Take 1 tablet (10 mg total) by mouth 2 (two) times daily. 60 tablet 3    metFORMIN HCl 1000 MG Oral Tab Take 1 tablet (1,000 mg total) by mouth 2 (two) times daily with meals.      ketoconazole 2 % External Cream Apply to affected area 2 times daily. 60 g 2    clindamycin 1 % External Lotion Apply 1 Application  topically 2 (two) times daily. Apply thin film to affected area(s). For acne 60 mL 6    spironolactone 100 MG Oral Tab Take 1 tablet (100 mg total) by mouth daily. 60 tablet 5    Norethin Ace-Eth Estrad-FE (BLISOVI FE 1.5/30) 1.5-30 MG-MCG Oral Tab Take 1 tablet by mouth daily. 28 tablet 12    Cholecalciferol (VITAMIN D) 50 MCG (2000 UT) Oral Cap Take by mouth.      calcipotriene 0.005 % External Cream Apply 1 Application topically 2 (two) times daily. To psoriasis knees and to shoulders 60 g 12    ondansetron 4 MG Oral Tablet Dispersible Take 1 tablet (4 mg total) by mouth every 8 (eight) hours as needed for Nausea.      Tretinoin 0.05 % External Cream Apply a small pea sized amount to areas of acne on the face as directed at bedtime. Use  over serums, clindamycin and moistruizers 20 g 6    LEVOCETIRIZINE 5 MG Oral Tab TAKE 1 TABLET(5 MG) BY MOUTH EVERY EVENING 90 tablet 1      History reviewed. No pertinent past medical history.   Social History:  Social History     Socioeconomic History    Marital status: Single   Tobacco Use    Smoking status: Never     Passive exposure: Never    Smokeless tobacco: Never   Vaping Use    Vaping status: Never Used   Substance and Sexual Activity    Alcohol use: Not Currently    Drug use: Never    Sexual activity: Never     Birth control/protection: Pill, OCP   Other Topics Concern    Grew up on a farm No    History of tanning Yes    Outdoor occupation No    Breast feeding No    Reaction to local anesthetic No    Pt has a pacemaker No    Pt has a defibrillator No     Social Determinants of Health      [facility], [facility]    Housing Stability                 Current Outpatient Medications   Medication Sig Dispense Refill    ketoconazole 2 % External Shampoo Use 2 times weekly to shampoo for psoriasis 120 mL 11    Mometasone Furoate 0.1 % External Solution Use bid to scalp 60 mL 6    Hydrocortisone 2.5 % External Lotion Apply bid to legs 120 mL 5    spironolactone 50 MG Oral Tab Take 3 tablets (150 mg total) by mouth daily. 270 tablet 1    BLISOVI FE 1/20 1-20 MG-MCG Oral Tab Take 1 tablet by mouth daily.      cetirizine 10 MG Oral Tab Take 1 tablet (10 mg total) by mouth 2 (two) times daily. 60 tablet 3    metFORMIN HCl 1000 MG Oral Tab Take 1 tablet (1,000 mg total) by mouth 2 (two) times daily with meals.      ketoconazole 2 % External Cream Apply to affected area 2 times daily. 60 g 2    clindamycin 1 % External Lotion Apply 1 Application  topically 2 (two) times daily. Apply thin film to affected area(s). For acne 60 mL 6    spironolactone 100 MG Oral Tab Take 1 tablet (100 mg total) by mouth daily. 60 tablet 5    Norethin Ace-Eth Estrad-FE (BLISOVI FE  1.5/30) 1.5-30 MG-MCG Oral Tab Take 1 tablet by mouth daily. 28 tablet 12    Cholecalciferol (VITAMIN D) 50 MCG (2000 UT) Oral Cap Take by mouth.      calcipotriene 0.005 % External Cream Apply 1 Application topically 2 (two) times daily. To psoriasis knees and to shoulders 60 g 12    ondansetron 4 MG Oral Tablet Dispersible Take 1 tablet (4 mg total) by mouth every 8 (eight) hours as needed for Nausea.      Tretinoin 0.05 % External Cream Apply a small pea sized amount to areas of acne on the face as directed at bedtime. Use over serums, clindamycin and moistruizers 20 g 6    LEVOCETIRIZINE 5 MG Oral Tab TAKE 1 TABLET(5 MG) BY MOUTH EVERY EVENING 90 tablet 1     Allergies:   No Known Allergies    History reviewed. No pertinent past medical history.  History reviewed. No pertinent surgical history.  Social History     Socioeconomic History    Marital status: Single     Spouse name: Not on file    Number of children: Not on file    Years of education: Not on file    Highest education level: Not on file   Occupational History    Not on file   Tobacco Use    Smoking status: Never     Passive exposure: Never    Smokeless tobacco: Never   Vaping Use    Vaping status: Never Used   Substance and Sexual Activity    Alcohol use: Not Currently    Drug use: Never    Sexual activity: Never     Birth control/protection: Pill, OCP   Other Topics Concern    Grew up on a farm No    History of tanning Yes    Outdoor occupation No    Breast feeding No    Reaction to local anesthetic No    Pt has a pacemaker No    Pt has a defibrillator No   Social History Narrative    Not on file     Social Determinants of Health     Financial Resource Strain: Not on file   Food Insecurity: Not on file   Transportation Needs: Not on file   Physical Activity: Not on file   Stress: Not on file   Social Connections: Not on file   Housing Stability: Low Risk  (7/3/2023)    [facility], [facility]     Housing Stability     Mortgage Payment Concerns?: Not on file     Number of Places Lived in the Last Year: Not on file     Unstable Housing?: Not on file     Family History   Problem Relation Age of Onset    Diabetes Mother     Cancer Maternal Grandmother         ovarian                      HPI :      Chief Complaint   Patient presents with    Acne     LOV 5/30/24. Pt present for 8 wk acne vulgaris f/u. Notes, there has been some improvement. Rx spironolactone 50 MG Oral Tab. Pt requesting refill of Mometasone Furoate 0.1 % External Solution for her scalp, and hydrocortisone 2.5 % lotion for her legs.     Follow-up patient with numerous skin complaints  History of acne, psoriasis.    Notes vulvar lesion inflamed irritated has been coming and going for many years.  Occasional irritation at axillary  Patient seeing endocrinology and OB.  PCOS insulin resistance prediabetes on metformin 1500 mg, and spironolactone.  Is been taking the metformin more consistently has significant nausea with this on thousand twice daily milligrams, spironolactone tolerating current dose, has noted improvement in acne with regimen.  Requests extended release metformin due to nausea.  Significant improvement in her acne since adding this and OCP      Use of tretinoin when limited by irritation and dryness    New acne cystic lesions on jawline and neck    Dermographism still problematic.  Xyzal helps.      Dermographism still not controlled.  Patient presents with concerns above.    Past notes/ records and appropriate/relevant lab results including pathology and past body maps reviewed. Updated and new information noted in current visit.       ROS:    Denies any other systemic complaints.  No fevers, chills, night sweats, sensitivity to the sun, deeper lumps or bumps.  No other skin complaints.  History, medications, allergies as noted.    Physical examination: Patient  well-developed well-nourished, alert oriented in no acute distress.   Exam of involved, appropriate areas of skin performed, including scalp, head, neck, face,nails, hair, external eyes, including conjunctival mucosa, eyelids, lips, external ears, back, chest, abdomen, arms, legs, palms.  Remarkable for lesions as noted   Grade2 acne cystic lesions chin jawline, lateral neck.  Chest back shoulders fairly stable no new keloids.  Psoriasis scalp stable,   Resolving lesions over the groin, vulvar area inflamed irritated no obvious double comedones.  Psoriasiform patches at axillary  ASSESSMENT AND PLAN:     Encounter Diagnoses   Name Primary?    Seborrheic dermatitis     Acne vulgaris Yes    Keloid scar     Psoriasis vulgaris     Medication monitoring encounter     Dermatographism     Acne, unspecified acne type     Cystic acne vulgaris     PCOS (polycystic ovarian syndrome)        Assessment / plan:    Patient seen for follow-up long-term monitoring, treatment of  Multiple health conditions including hidradenitis, psoriasis, severe cystic acne, keloids patient with PCOS.  Requiring careful monitoring medications  Plan of care:  ongoing surveillance, monitoring including regular follow-up due to longer term risk of recurrence, new lesions.  See previous notes.  There is a longitudinal care relationship with me, the care plan reflects the ongoing nature of the continuous relationship of care, and the medical record indicates that there is ongoing treatment of a serious/complex medical condition which I am currently managing.  is Applicable    Acne. See medications in grid.  Skin care regimen discussed at length including cleansers, makeup, face washing, sunscreen.  Recheck in 6 -8 weeks if no improvement.  Notify us promptly if problems tolerating regimen.  Consider more aggressive therapy if not responding.  Improving nodular lesions still some active areas over the cheeks chin jaw line  No intralesional's at this visit.  Continue topical and oral medications  Continue her current  regimen overall improved patient notes diet absolutely triggers more inflamed cystic lesions.  Not interested in isotretinoin at this time  Potassium normal  Has been taking spironolactone and metformin   with this and OCP is followed by endocrinology and OB as well insulin resistance PCOS, obesity, dyslipidemia noted.  Associations with psoriasis noted as well  Aldactone should be taken with caution against pregnancy as it can cause male genitourinary defects with pregnancy. Usual side effects --possible more frequent urination, lower blood pressure, possible breast tenderness, irregular menses.    Continue spironolactone patient generally taking 100 mg twice daily consistently.  Additional 50 mg sometimes often forgets second dose.  Does not take these together.  May take 150 mg at once if tolerating.  Overall has been doing much better with consistent medications, avoidance of food triggers.  Continue dietary controls.  Now on extended release metformin tolerating this better has been helpful.  Continue topicals as tolerated.  Patient using multiple over-the-counter products as well    More postinflammatory changes are noted with erythema, some scarring,    Keloids continue be a concern.  Discussed adding antihistamine as per [facility] regimen.  Labs reviewed elevated testosterone DHEA-S previously noted.  Psoriasis fairly stable continue current regimen add calcipotriene cream twice daily may use this on ears involvement on back shoulders and neck as well area is sensitive to topical steroids uses clobetasol with improvement on the knees incomplete clearing will add the cause of trying to these areas as well continue moisturizers    As noted previously continue management of underlying health conditions has been okay  Vulvar lesions consistent with hidradenitis the fact this frequently associated with acne, psoriasis, PCOS reviewed.  Dietary management helpful spironolactone and topicals should be  helpful as well  Hidradenitis suppurativa.  Chronic recurrent nature of this discussed.  Chronic condition with exacerbations.  Patient with old lesions postinflammatory changes as well as new active lesions.  Management including topical oral antibiotics, local care discussed.  Long-term need for management reviewed.  The fact this can be associated with other endocrine problems including PCO S discussed.  Newer therapies including Humira discussed.  Surgical management of chronic sinus tracts reviewed other topical therapies, local management discussed.  Continue ketoconazole cream to axillary area, vulvar area as needed      Scalp seborrheic dermatitis continue ketoconazole shampoo mometasone which have been helpful  For scaly areas will add keralyt   gel.  OTC.    Psoriasis.  Plaque-type.  10% body surface involvement.  Will treat with medications and grid.  Overall skincare, liberal use of emollients discussed.  Consider more aggressive therapy if worsening.Patient will let us know how they are doing over the next several weeks.  Await clinical response to above therapy.  Recheck in 2-3 months if no improvement.  Continue to monitor, alternatives discussed.  Overall scalp doing okay presently    Dermographism continue antihistamines using daily twice daily, consider Dupixent  Hydrocortisone lotion twice daily to legs.  We will recheck in 3 to 4 months.  Pathophysiology discussed with patient.  Therapeutic options reviewed.  See  Medications in grid.  Instructions reviewed at length.     General skin care questions answered.   Reassurance regarding benign skin lesions.Signs and symptoms of skin cancer, ABCDE's of melanoma briefly reviewed.  Sunscreen use, sun protection, encouraged.  Followup as noted in rtc or p.r.n.    Encounter Times new patient  Including precharting, reviewing chart, prior notes obtaining history: 10 minutes, medical exam :10 minutes, notes on body map, plan, counseling 10minutes My total  time spent caring for the patient on the day of the encounter: 30 minutes     The patient indicates understanding of these issues and agrees to the plan.  The patient is asked to return as noted in follow-up /as noted above    This note was generated using Dragon voice recognition software.  Please contact me regarding any confusion resulting from errors in recognition.  Note to patient and family: The 21st Century Cures Act makes medical notes like these available to patients. However, be advised this is a medical document. It is intended as [id number] communication and monitoring of a patient's care needs. It is written in medical language and may contain abbreviations or verbiage that are unfamiliar. It may appear blunt or direct. Medical documents are intended to carry relevant information, facts as evident and the clinical opinion of the practitioner.  No orders of the defined types were placed in this encounter.      Meds & Refills for this Visit:   Requested Prescriptions     Signed Prescriptions Disp Refills    ketoconazole 2 % External Shampoo 120 mL 11     Sig: Use 2 times weekly to shampoo for psoriasis    Mometasone Furoate 0.1 % External Solution 60 mL 6     Sig: Use bid to scalp    Hydrocortisone 2.5 % External Lotion 120 mL 5     Sig: Apply bid to legs    spironolactone 50 MG Oral Tab 270 tablet 1     Sig: Take 3 tablets (150 mg total) by mouth daily.       Acne vulgaris  (primary encounter diagnosis)  Psoriasis vulgaris  Keloid scar  Dermatographism  Abnormal menses    No orders of the defined types were placed in this encounter.      Results From Past 48 Hours:  No results found for this or any previous visit (from the past 48 hour(s)).    Meds This Visit:      Imaging Orders:  None     Referral Orders:  No orders of the defined types were placed in this encounter.

## 2024-12-19 ENCOUNTER — OFFICE VISIT (OUTPATIENT)
Dept: DERMATOLOGY CLINIC | Facility: CLINIC | Age: 24
End: 2024-12-19

## 2024-12-19 DIAGNOSIS — L70.0 CYSTIC ACNE VULGARIS: Primary | ICD-10-CM

## 2024-12-19 DIAGNOSIS — L21.9 SEBORRHEIC DERMATITIS: ICD-10-CM

## 2024-12-19 DIAGNOSIS — L70.9 ACNE, UNSPECIFIED ACNE TYPE: ICD-10-CM

## 2024-12-19 PROCEDURE — 99214 OFFICE O/P EST MOD 30 MIN: CPT | Performed by: DERMATOLOGY

## 2024-12-19 PROCEDURE — 11900 INJECT SKIN LESIONS </W 7: CPT | Performed by: DERMATOLOGY

## 2024-12-19 RX ORDER — KETOCONAZOLE 20 MG/ML
SHAMPOO, SUSPENSION TOPICAL
Qty: 120 ML | Refills: 11 | Status: SHIPPED | OUTPATIENT
Start: 2024-12-19

## 2024-12-19 RX ORDER — SPIRONOLACTONE 50 MG/1
150 TABLET, FILM COATED ORAL DAILY
Qty: 270 TABLET | Refills: 1 | Status: SHIPPED | OUTPATIENT
Start: 2024-12-19

## 2024-12-19 RX ORDER — SPIRONOLACTONE 25 MG/1
25 TABLET ORAL DAILY
Qty: 90 TABLET | Refills: 1 | Status: SHIPPED | OUTPATIENT
Start: 2024-12-19

## 2024-12-19 RX ORDER — MOMETASONE FUROATE 1 MG/ML
SOLUTION TOPICAL
Qty: 60 ML | Refills: 6 | Status: SHIPPED | OUTPATIENT
Start: 2024-12-19

## 2024-12-19 RX ORDER — TRETINOIN 0.5 MG/G
CREAM TOPICAL
Qty: 20 G | Refills: 6 | Status: SHIPPED | OUTPATIENT
Start: 2024-12-19

## 2024-12-19 RX ORDER — KETOCONAZOLE 20 MG/G
CREAM TOPICAL
Qty: 60 G | Refills: 2 | Status: SHIPPED | OUTPATIENT
Start: 2024-12-19

## 2024-12-19 RX ORDER — CETIRIZINE HYDROCHLORIDE 10 MG/1
10 TABLET ORAL 2 TIMES DAILY
Qty: 60 TABLET | Refills: 5 | Status: SHIPPED | OUTPATIENT
Start: 2024-12-19

## 2024-12-19 NOTE — PROGRESS NOTES
Candace Boyd is a 24 year old female.    CC:    Chief Complaint   Patient presents with    Acne     LOV 7/25/24. Pt with Hx of acne vulgaris and psoriasis, present for f/u. Still working on acne scars at this time. Applying minimal amount of Tretinoin 0.05 % External Cream due to winter months. Has also cut back on spironolactone 50 MG Oral Tab.                    Psoriasis     LOV 7/25/24. Pt requesting refills for cetirizine 10 MG Oral Tab, ketoconazole 2 % External Shampoo, and Mometasone Furoate 0.1 % External Solution         HISTORY:    History reviewed. No pertinent past medical history.   History reviewed. No pertinent surgical history.   Family History   Problem Relation Age of Onset    Diabetes Mother     Cancer Maternal Grandmother         ovarian      Social History     Socioeconomic History    Marital status: Single   Tobacco Use    Smoking status: Never     Passive exposure: Never    Smokeless tobacco: Never   Vaping Use    Vaping status: Never Used   Substance and Sexual Activity    Alcohol use: Not Currently    Drug use: Never    Sexual activity: Never     Birth control/protection: Pill, OCP   Other Topics Concern    Grew up on a farm No    History of tanning Yes    Outdoor occupation No    Breast feeding No    Reaction to local anesthetic No    Pt has a pacemaker No    Pt has a defibrillator No     Social Drivers of Health      [facility], [facility]    Housing Stability        Current Outpatient Medications   Medication Sig Dispense Refill    ketoconazole 2 % External Shampoo Use 2 times weekly to shampoo for psoriasis 120 mL 11    spironolactone 50 MG Oral Tab Take 3 tablets (150 mg total) by mouth daily. For flares of acne- 270 tablet 1    Mometasone Furoate 0.1 % External Solution Use bid to scalp 60 mL 6    ketoconazole 2 % External Cream Apply to affected area 2 times daily. 60 g 2    cetirizine 10 MG Oral Tab Take 1 tablet (10 mg total)  by mouth 2 (two) times daily. 60 tablet 5    Tretinoin 0.05 % External Cream Apply a small pea sized amount to areas of acne on the face as directed at bedtime. Use over serums, clindamycin and moistruizers 20 g 6    spironolactone 25 MG Oral Tab Take 1 tablet (25 mg total) by mouth daily. For continuous maintenance 90 tablet 1    sertraline 25 MG Oral Tab Take 1 tablet (25 mg total) by mouth daily. 90 tablet 0    Hydrocortisone 2.5 % External Lotion Apply bid to legs 120 mL 5    metFORMIN HCl 1000 MG Oral Tab Take 1 tablet (1,000 mg total) by mouth 2 (two) times daily with meals.      clindamycin 1 % External Lotion Apply 1 Application  topically 2 (two) times daily. Apply thin film to affected area(s). For acne 60 mL 6    spironolactone 100 MG Oral Tab Take 1 tablet (100 mg total) by mouth daily. 60 tablet 5    ondansetron 4 MG Oral Tablet Dispersible Take 1 tablet (4 mg total) by mouth every 8 (eight) hours as needed for Nausea.      LEVOCETIRIZINE 5 MG Oral Tab TAKE 1 TABLET(5 MG) BY MOUTH EVERY EVENING 90 tablet 1    calcipotriene 0.005 % External Cream Apply 1 Application topically 2 (two) times daily. To psoriasis knees and to shoulders (Patient not taking: Reported on 12/19/2024) 60 g 12     Allergies:   Allergies[1]    History reviewed. No pertinent past medical history.  History reviewed. No pertinent surgical history.  Social History     Socioeconomic History    Marital status: Single     Spouse name: Not on file    Number of children: Not on file    Years of education: Not on file    Highest education level: Not on file   Occupational History    Not on file   Tobacco Use    Smoking status: Never     Passive exposure: Never    Smokeless tobacco: Never   Vaping Use    Vaping status: Never Used   Substance and Sexual Activity    Alcohol use: Not Currently    Drug use: Never    Sexual activity: Never     Birth control/protection: Pill, OCP   Other Topics Concern    Grew up on a farm No    History of tanning  Yes    Outdoor occupation No    Breast feeding No    Reaction to local anesthetic No    Pt has a pacemaker No    Pt has a defibrillator No   Social History Narrative    Not on file     Social Drivers of Health     Financial Resource Strain: Not on file   Food Insecurity: Not on file   Transportation Needs: Not on file   Physical Activity: Not on file   Stress: Not on file   Social Connections: Not on file   Housing Stability: Low Risk  (7/3/2023)    Received from [facility], [facility]    Housing Stability     Mortgage Payment Concerns?: Not on file     Number of Places Lived in the Last Year: Not on file     Unstable Housing?: Not on file     Family History   Problem Relation Age of Onset    Diabetes Mother     Cancer Maternal Grandmother         ovarian       HPI:     HPI:  Chief Complaint   Patient presents with    Acne     LOV 7/25/24. Pt with Hx of acne vulgaris and psoriasis, present for f/u. Still working on acne scars at this time. Applying minimal amount of Tretinoin 0.05 % External Cream due to winter months. Has also cut back on spironolactone 50 MG Oral Tab.                    Psoriasis     LOV 7/25/24. Pt requesting refills for cetirizine 10 MG Oral Tab, ketoconazole 2 % External Shampoo, and Mometasone Furoate 0.1 % External Solution     Follow-up acne, new cystic nodule on left cheek, overall has noted still scars, inflammatory lesions.  Diet has particularly been problematic with triggering flareups especially cheese, dairy and gluten.  Has been using tretinoin and noticed difficulty tolerating this due to dryness in the winter.  Has been taking spironolactone more intermittently for 2 to 3 days at a time with flareups.  Generally has to take this every couple of weeks.  Has noted less stress now will be finishing her associates degree.    Has noted scalp generally doing well with psoriasis under control with ketoconazole shampoo mometasone Zyrtec.    Has been  using event balm which has helped with dryness on the face feels acne postinflammatory changes in particular have improved with this.  Continues with her usual skin care regimen.    Otherwise nothing new or different healthwise  Labs from earlier this year reviewed hemoglobin A1c 5.8 renal liver functions potassium normal insulin level elevated at 33.7 platelets elevated previously  DHEA-S mid to upper range in 2023 and testosterone free and weakly bound elevated    No personal  or family history of skin cancer    Patient presents with concerns above.    Patient has been in their usual state of health.     Past notes/ records and appropriate/relevant lab results including pathology and past body maps reviewed. Including outside notes/ PCP notes as appropriate. Updated and new information noted in current visit.     ROS:  Denies other relevant systemic complaints. See HPI.     History, medications, allergies reviewed as noted.    Allergies:  Patient has no known allergies.      There were no vitals filed for this visit.    Physical Examination:     Well-developed well-nourished patient alert oriented in no acute distress.  Exam performed of appropriate involved areas    See map today's date for lesions noted .  See assessment and plan below for specific lesions.    Otherwise remarkable for lesions as noted on map.    See A/P  below for additional information:    Assessment / plan:    Orders Placed This Encounter   Procedures    INJECTION INTO SKIN LESIONS       Meds & Refills for this Visit:  Requested Prescriptions     Signed Prescriptions Disp Refills    ketoconazole 2 % External Shampoo 120 mL 11     Sig: Use 2 times weekly to shampoo for psoriasis    spironolactone 50 MG Oral Tab 270 tablet 1     Sig: Take 3 tablets (150 mg total) by mouth daily. For flares of acne-    Mometasone Furoate 0.1 % External Solution 60 mL 6     Sig: Use bid to scalp    ketoconazole 2 % External Cream 60 g 2     Sig: Apply to affected  area 2 times daily.    cetirizine 10 MG Oral Tab 60 tablet 5     Sig: Take 1 tablet (10 mg total) by mouth 2 (two) times daily.    Tretinoin 0.05 % External Cream 20 g 6     Sig: Apply a small pea sized amount to areas of acne on the face as directed at bedtime. Use over serums, clindamycin and moistruizers    spironolactone 25 MG Oral Tab 90 tablet 1     Sig: Take 1 tablet (25 mg total) by mouth daily. For continuous maintenance         Encounter Diagnoses   Name Primary?    Seborrheic dermatitis     Acne, unspecified acne type     Cystic acne vulgaris Yes     Acne. See medications in grid.  Skin care regimen discussed at length including cleansers, makeup, face washing, sunscreen.  Recheck in 6 -8 weeks if no improvement.  Notify us promptly if problems tolerating regimen.  Consider more aggressive therapy if not responding.  Insetting of insulin resistance, PCOS  Encouraged more consistent spironolactone 25 mg daily may increase as needed with flare continue topicals tretinoin has been helpful continue.  Using a fan balm which has been helping.  Continue monitoring.  Continue working on control PCOS and insulin levels.    New cystic nodule at chin intralesional injection as noted has been helpful in the past       Psoriasis.  Plaque-type.  10% body surface involvement.  Will treat with medications and grid.  Overall skincare, liberal use of emollients discussed.  Consider more aggressive therapy if worsening.Patient will let us know how they are doing over the next several weeks.  Await clinical response to above therapy.  Recheck in 2-3 months if no improvement.  Continue ketoconazole shampoo, topical steroids.  She does have clobetasol cream at home may continue with this.  Notes some scaling on hand and on feet ketoconazole to feet, and webspaces.  Use clobetasol to itchy areas on feet and hands  Continue cetirizine    Benign-appearing nevi, no atypical features on dermoscopy reassurance given monitor.         No other susupicious lesions on todays  exam.    Please refer to map for specific lesions.  See additional diagnoses.  Pros cons of various therapies, risks benefits discussed.Pathophysiology, terapeutic options reviewed.  See  Medications in grid.  Instructions reviewed at length.    Reassurance regarding other benign skin lesions.    Monitor for new or changing lesions. Signs and symptoms of skin cancer, ABCDE's of melanoma ( additional information available at AAD.org, skincancer.org) Encourage Sunscreen (broad-spectrum, ideally mineral-based-UVA/UVB -SPF 30 or higher) use encouraged, sun protection/sun protective clothing, self exams reviewed Followup as noted RTC ---routine checkup 6 mos -one year or p.r.n.    Encounter Times   Including precharting, reviewing chart, prior notes obtaining history, medical exam, notes on body map, plan, counseling, discussion of therapeutic options, patient education, orders more than half total time spent in face to face time with patient.  My total time spent caring for the patient on the day of the encounter: 35 minutes       The patient indicates understanding of these issues and agrees to the plan.  The patient is asked to return as noted in follow-up/ above.    This note was generated using Dragon voice recognition software.  Please contact me regarding any confusion resulting from errors in recognition..  Note to patient and family: The 21st Century Cures Act makes medical notes like these available to patients. However, be advised this is a medical document. It is intended as [id number] communication and monitoring of a patient's care needs. It is written in medical language and may contain abbreviations or verbiage that are unfamiliar. It may appear blunt or direct. Medical documents are intended to carry relevant information, facts as evident and the clinical opinion of the practitioner.         [1] No Known Allergies

## 2025-01-21 ENCOUNTER — TELEPHONE (OUTPATIENT)
Dept: SURGERY | Facility: CLINIC | Age: 25
End: 2025-01-21

## 2025-01-24 ENCOUNTER — OFFICE VISIT (OUTPATIENT)
Dept: SURGERY | Facility: CLINIC | Age: 25
End: 2025-01-24
Payer: MEDICAID

## 2025-01-24 VITALS
WEIGHT: 248 LBS | HEIGHT: 64.2 IN | OXYGEN SATURATION: 99 % | DIASTOLIC BLOOD PRESSURE: 80 MMHG | SYSTOLIC BLOOD PRESSURE: 126 MMHG | HEART RATE: 102 BPM | BODY MASS INDEX: 42.34 KG/M2

## 2025-01-24 DIAGNOSIS — F41.8 DEPRESSION WITH ANXIETY: ICD-10-CM

## 2025-01-24 DIAGNOSIS — E66.01 MORBID OBESITY WITH BMI OF 40.0-44.9, ADULT (HCC): ICD-10-CM

## 2025-01-24 DIAGNOSIS — E28.2 PCOS (POLYCYSTIC OVARIAN SYNDROME): ICD-10-CM

## 2025-01-24 DIAGNOSIS — R63.2 BINGE EATING: ICD-10-CM

## 2025-01-24 DIAGNOSIS — E78.5 DYSLIPIDEMIA: Primary | ICD-10-CM

## 2025-01-24 DIAGNOSIS — E88.819 INSULIN RESISTANCE: ICD-10-CM

## 2025-01-24 PROCEDURE — 99214 OFFICE O/P EST MOD 30 MIN: CPT | Performed by: NURSE PRACTITIONER

## 2025-01-24 RX ORDER — DIETHYLPROPION HYDROCHLORIDE 25 MG/1
1 TABLET ORAL 2 TIMES DAILY WITH MEALS
Qty: 60 TABLET | Refills: 3 | Status: SHIPPED | OUTPATIENT
Start: 2025-01-24 | End: 2025-02-23

## 2025-01-24 NOTE — PATIENT INSTRUCTIONS
[facility]     [email or web address]    : [email or web address]    They have locations in [place] and [facility]    Intensive out patient is virtually    Partial hospitalization is on [facility] with Dr Josue Martin

## 2025-01-24 NOTE — PROGRESS NOTES
[facility], [place]  [address] [facility] [address] [place] [id number]  Dept: [phone]       Patient:  Candace Boyd  :      2000  MRN:      [id number]    Chief Complaint:    Chief Complaint   Patient presents with    Follow - Up    Weight Management    Obesity       SUBJECTIVE     History of Present Illness:  Candace is being seen today for a follow-up for weight management.    Online school.    Initial HPI:  24 yo female.   Presents to clinic for assistance with weight loss/maintenance.   Reports first noticing weight gain during puberty.   Hx PCOS.   Describes nausea regularly.   Recently started Vyvanse- has lost close to 10 lbs. Also takes/tolerating metformin.     Patient is considering medications and is not considering bariatric surgery for weight loss.    Patient has history of eating disorder(s).   Binge Eating.    Patient is in college, works a student job.  Patient lives with dad, Theresa (grandma).    Patient's goal weight:   Biggest weight loss in the past: minimal   How weight loss was achieved: exercise   Heaviest weight ever:  Previous use of medical weight loss medications:    Physical activity:   Recently stopped    Sleep: 5-10 hours/night    Sleep screening:       Past Medical History: History reviewed. No pertinent past medical history.     Comorbidities:  Hyperlipidemia-Improvement?  yes    OBJECTIVE     Vitals: /80   Pulse 102   Ht 5' 4.2\" (1.631 m)   Wt 248 lb (112.5 kg)   LMP 2024 (Approximate)   SpO2 99%   BMI 42.30 kg/m²     Initial weight loss: +12   Total weight loss: +14   Start weight: 234    Wt Readings from Last 6 Encounters:   25 248 lb (112.5 kg)   24 240 lb (108.9 kg)   07/15/24 236 lb (107 kg)   24 236 lb 12.8 oz (107.4 kg)   24 240 lb (108.9 kg)   24 235 lb (106.6 kg)       Patient Medications:    Current Outpatient Medications   Medication Sig Dispense Refill     Diethylpropion HCl 25 MG Oral Tab Take 1 tablet by mouth 2 (two) times daily with meals. 60 tablet 3    ketoconazole 2 % External Shampoo Use 2 times weekly to shampoo for psoriasis 120 mL 11    spironolactone 50 MG Oral Tab Take 3 tablets (150 mg total) by mouth daily. For flares of acne- 270 tablet 1    Mometasone Furoate 0.1 % External Solution Use bid to scalp 60 mL 6    ketoconazole 2 % External Cream Apply to affected area 2 times daily. 60 g 2    cetirizine 10 MG Oral Tab Take 1 tablet (10 mg total) by mouth 2 (two) times daily. 60 tablet 5    Tretinoin 0.05 % External Cream Apply a small pea sized amount to areas of acne on the face as directed at bedtime. Use over serums, clindamycin and moistruizers 20 g 6    spironolactone 25 MG Oral Tab Take 1 tablet (25 mg total) by mouth daily. For continuous maintenance 90 tablet 1    sertraline 25 MG Oral Tab Take 1 tablet (25 mg total) by mouth daily. 90 tablet 0    Hydrocortisone 2.5 % External Lotion Apply bid to legs 120 mL 5    metFORMIN HCl 1000 MG Oral Tab Take 1 tablet (1,000 mg total) by mouth 2 (two) times daily with meals.      clindamycin 1 % External Lotion Apply 1 Application  topically 2 (two) times daily. Apply thin film to affected area(s). For acne 60 mL 6    spironolactone 100 MG Oral Tab Take 1 tablet (100 mg total) by mouth daily. 60 tablet 5    calcipotriene 0.005 % External Cream Apply 1 Application topically 2 (two) times daily. To psoriasis knees and to shoulders (Patient not taking: Reported on 12/19/2024) 60 g 12    ondansetron 4 MG Oral Tablet Dispersible Take 1 tablet (4 mg total) by mouth every 8 (eight) hours as needed for Nausea.      LEVOCETIRIZINE 5 MG Oral Tab TAKE 1 TABLET(5 MG) BY MOUTH EVERY EVENING 90 tablet 1     Allergies:  Patient has no known allergies.     Social History: Reviewed     Surgical History:  History reviewed. No pertinent surgical history.  Family History:    Family History   Problem Relation Age of Onset     Diabetes Mother     Cancer Maternal Grandmother         ovarian     Initial Intake:  After dinner behavior: -  Night eating: -  Portion sizes: +  Binge: +  Emotional: +  Depression: Score: 17  Grazing:   Sweet tooth:  Crunchy/salty:  Etoh: rare binge drink episodes since turning age 23  Drinks water, coffee  Soda Drinker: Yes                 If yes, how much?:  Rare  Sports Drinks:  No                  Juice:  No                     Number of restaurant or fast food meals/week:  meals/week    Food Journal  Reviewed and Discussed:       Patient has a Food Journal?: yes [employer] Diary, 28 cycle based  Patient is reading nutrition labels?  yes  Average Caloric Intake:   1800  Average CHO Intake:   Is patient exercising? no  Type of exercise?   Plans to restart classes at [employer]  Will walk in good weather     Eating Habits  Patient states the following:  Eats 3 meal(s) per day  Length of time it takes to consume a meal:    # of snacks per day:    Type of snacks:    Amount of soda consumption per day:    Amount of water (in ounces) per day:    Toughest challenge: food, exercise, dairy free ice cream    Less sleep over the summer    Nutritional Goals  Eat 3-4 cups of fresh fruits or vegetables daily    Behavior Modifications Reviewed and Discussed  Eat breakfast, Eat 3 meals per day, Plan meals in advance, Read nutrition labels, Drink 64 oz of water per day, Maintain a daily food journal, Utlize portion control strategies to reduce calorie intake, Identify triggers for eating and manage cues, and Eat slowly and take 20 to 30 minutes to complete each meal    Exercise Goals Reviewed and Discussed    Aim for 150 minutes moderate level exercise weekly with 2-3 days strength training as tolerated    ROS:    Constitutional: positive for fatigue  Respiratory: negative  Cardiovascular: negative  Gastrointestinal: negative  Integument/breast: positive for acne   Hematologic/lymphatic: negative  Musculoskeletal:positive for  arthralgias  Neurological: negative  Behavioral/Psych: positive for anxiety and depression  Endocrine: negative  All other systems were reviewed and are negative    Physical Exam:  General appearance: alert, appears stated age, cooperative and obese  Head: Normocephalic, without obvious abnormality, atraumatic  Neck: no adenopathy, no carotid bruit, no JVD, supple, symmetrical, trachea midline and thyroid not enlarged, symmetric, no tenderness/mass/nodules  Lungs: clear to auscultation bilaterally  Heart: S1, S2 normal, no murmur, click, rub or gallop, regular rate and rhythm  Abdomen: soft, non-tender; bowel sounds normal; no masses,  no organomegaly and abdomen obese   Extremities: intact, no edema   Pulses: 2+ and symmetric  Skin: intact   Neurologic: Grossly normal       ASSESSMENT       Encounter Diagnosis(ses):   Encounter Diagnoses   Name Primary?    Dyslipidemia Yes    Binge eating     Morbid obesity with BMI of 40.0-44.9, adult (HCC)     Depression with anxiety     Insulin resistance     PCOS (polycystic ovarian syndrome)              PLAN         Diagnoses and all orders for this visit:    Dyslipidemia    Binge eating    Morbid obesity with BMI of 40.0-44.9, adult (HCC)  -     Diethylpropion HCl 25 MG Oral Tab; Take 1 tablet by mouth 2 (two) times daily with meals.    Depression with anxiety    Insulin resistance    PCOS (polycystic ovarian syndrome)        DYSLIPIDEMIA: Recommend dietary changes and lifestyle modifications as discussed below. Monitor.      Lab Results   Component Value Date/Time    CHOLEST 223 (H) 05/11/2024 09:16 AM     (H) 05/11/2024 09:16 AM    HDL 68 (H) 05/11/2024 09:16 AM    TRIG 261 (H) 05/11/2024 09:16 AM    VLDL 45 (H) 05/11/2024 09:16 AM     OBESITY/WEIGHT GAIN:     Recommended patient continue intensive lifestyle and behavioral modifications at this time for weight loss.     Reviewed lifestyle modifications: Whole Food/Plant Strong/Low Glycemic Index diet, moderate  alcohol consumption, reduced sodium intake to no more than 2,400 mg/day, and at least 150 minutes of moderate physical activity per week.    Avoid processed, poor quality carbohydrates, refined grains, flour, sugar.     Goals for next month:  1. Keep a food log.  2. Drink 64 ounces of non-caloric beverages per day. No fruit juices or regular soda.  3. Aim for 150 minutes moderate exercise per week.    4. Increase fruit and vegetable servings to 5-6 per day.    5. Improve sleep and stress.      Reviewed labs:  7/6/23- a1c 5.9.  10/25/23- a1c 5.6. .   Fasting insulin elevated.      Discussed medication options for weight loss in detail with patient.      Stopped wellbutrin.  Has started sertraline since last visit.  Neurofeedback.    Continue metformin (ordered by lennie).    Last dose of Vyanse May 2024.  Holding off on Vyvanse- too stimulating.     Add 25 mg diethylpropion in the AM. Increase to BID as tolerated.     Discussed risks, benefits, rationale, and side effects of medication(s).      Lifestyle Medicine Assessment.  Healthy Plate Method.  Daily Inactive Calories: 220-249 lbs: 1800 calories/day.   Aim for 90 grams protein/day.  Improve breakfast to whole food, non-processed.  Daily ACV in lemon water.      Increase walking/classes.    Consider [facility].    RTC 4 months.      MYRNA Ng

## 2025-02-02 ENCOUNTER — LAB ENCOUNTER (OUTPATIENT)
Dept: LAB | Facility: HOSPITAL | Age: 25
End: 2025-02-02
Attending: INTERNAL MEDICINE
Payer: MEDICAID

## 2025-02-02 DIAGNOSIS — R73.03 PRE-DIABETES: ICD-10-CM

## 2025-02-02 DIAGNOSIS — E88.819 INSULIN RESISTANCE: ICD-10-CM

## 2025-02-02 DIAGNOSIS — E78.5 DYSLIPIDEMIA: ICD-10-CM

## 2025-02-02 DIAGNOSIS — E28.2 PCOS (POLYCYSTIC OVARIAN SYNDROME): ICD-10-CM

## 2025-02-02 LAB
ALBUMIN SERPL-MCNC: 4.6 G/DL (ref 3.2–4.8)
ALBUMIN/GLOB SERPL: 1.6 {RATIO} (ref 1–2)
ALP LIVER SERPL-CCNC: 84 U/L
ALT SERPL-CCNC: 38 U/L
ANION GAP SERPL CALC-SCNC: 9 MMOL/L (ref 0–18)
AST SERPL-CCNC: 29 U/L (ref ?–34)
BILIRUB SERPL-MCNC: 0.3 MG/DL (ref 0.3–1.2)
BUN BLD-MCNC: 8 MG/DL (ref 9–23)
BUN/CREAT SERPL: 10.8 (ref 10–20)
CALCIUM BLD-MCNC: 9.5 MG/DL (ref 8.7–10.4)
CHLORIDE SERPL-SCNC: 103 MMOL/L (ref 98–112)
CHOLEST SERPL-MCNC: 217 MG/DL (ref ?–200)
CO2 SERPL-SCNC: 27 MMOL/L (ref 21–32)
CREAT BLD-MCNC: 0.74 MG/DL
EGFRCR SERPLBLD CKD-EPI 2021: 117 ML/MIN/1.73M2 (ref 60–?)
EST. AVERAGE GLUCOSE BLD GHB EST-MCNC: 128 MG/DL (ref 68–126)
FASTING PATIENT LIPID ANSWER: YES
FASTING STATUS PATIENT QL REPORTED: YES
GLOBULIN PLAS-MCNC: 2.8 G/DL (ref 2–3.5)
GLUCOSE BLD-MCNC: 106 MG/DL (ref 70–99)
HBA1C MFR BLD: 6.1 % (ref ?–5.7)
HDLC SERPL-MCNC: 68 MG/DL (ref 40–59)
LDLC SERPL CALC-MCNC: 100 MG/DL (ref ?–100)
NONHDLC SERPL-MCNC: 149 MG/DL (ref ?–130)
OSMOLALITY SERPL CALC.SUM OF ELEC: 287 MOSM/KG (ref 275–295)
POTASSIUM SERPL-SCNC: 3.9 MMOL/L (ref 3.5–5.1)
PROT SERPL-MCNC: 7.4 G/DL (ref 5.7–8.2)
SODIUM SERPL-SCNC: 139 MMOL/L (ref 136–145)
TRIGL SERPL-MCNC: 295 MG/DL (ref 30–149)
VLDLC SERPL CALC-MCNC: 50 MG/DL (ref 0–30)

## 2025-02-02 PROCEDURE — 36415 COLL VENOUS BLD VENIPUNCTURE: CPT

## 2025-02-02 PROCEDURE — 80061 LIPID PANEL: CPT

## 2025-02-02 PROCEDURE — 80053 COMPREHEN METABOLIC PANEL: CPT

## 2025-02-02 PROCEDURE — 83525 ASSAY OF INSULIN: CPT

## 2025-02-02 PROCEDURE — 83036 HEMOGLOBIN GLYCOSYLATED A1C: CPT

## 2025-02-03 LAB — INSULIN SERPL-ACNC: 40 MU/L (ref 3–25)

## 2025-02-04 ENCOUNTER — OFFICE VISIT (OUTPATIENT)
Dept: ENDOCRINOLOGY CLINIC | Facility: CLINIC | Age: 25
End: 2025-02-04

## 2025-02-04 VITALS
SYSTOLIC BLOOD PRESSURE: 119 MMHG | HEART RATE: 89 BPM | WEIGHT: 249 LBS | HEIGHT: 64.2 IN | BODY MASS INDEX: 42.51 KG/M2 | DIASTOLIC BLOOD PRESSURE: 55 MMHG

## 2025-02-04 DIAGNOSIS — E78.5 DYSLIPIDEMIA: ICD-10-CM

## 2025-02-04 DIAGNOSIS — E88.819 INSULIN RESISTANCE: ICD-10-CM

## 2025-02-04 DIAGNOSIS — E28.2 PCOS (POLYCYSTIC OVARIAN SYNDROME): Primary | ICD-10-CM

## 2025-02-04 DIAGNOSIS — F32.A DEPRESSION, UNSPECIFIED DEPRESSION TYPE: ICD-10-CM

## 2025-02-04 DIAGNOSIS — R73.03 PRE-DIABETES: ICD-10-CM

## 2025-02-04 PROCEDURE — 99214 OFFICE O/P EST MOD 30 MIN: CPT | Performed by: INTERNAL MEDICINE

## 2025-02-04 NOTE — PROGRESS NOTES
Follow-up - Reason for Visit:  Possible PCOS.  Requesting Physician: Dr. Carbajal.    CHIEF COMPLAINT:    Chief Complaint   Patient presents with    Polycystic Ovary Syndrome (PCOS)     Follow up, last labs 1/2025         HISTORY OF PRESENT ILLNESS:   Candace Boyd is a 24 year old female who presents with PCOS for follow-up.     She has acne, increased hair growth and oligo-ovulatory cycles.     She is currently taking metformin ER 1g PO qAM and has not been able to increase past this due to the fact that she forgets to take the evening dose.     She is doing well on the spironolactone (100mg PO qday) and the addition of the combined OCP (lo-estrin Lo), noting some improvement in acne as well as softening and decreased hair growth. Her cycle are still not regular.     She has a binge-eating disorder, anxiety, depression, ADHD since she was a child. She has just been able to come to the doctor to have this treated.     She has pre-diabetes, and the diabetes runs in her family. She has always wanted to try and lose weight, but has not been successful with this.     At the last visit, she had told me that she was not feeling well on the current OCPs, so I had prescribed her a different type of OCPs.     She is taking these.     She is not able to take any of her medications regularly, due to the fact that she forgets to take them. She sees a therapist, but I am unsure if she is getting the mental health care that she needs. When I ask her about this, she becomes tearful.       PAST MEDICAL HISTORY:   No past medical history on file.    PAST SURGICAL HISTORY:   No past surgical history on file.    SOCIAL HISTORY:    Social History     Socioeconomic History    Marital status: Single   Tobacco Use    Smoking status: Never     Passive exposure: Never    Smokeless tobacco: Never   Vaping Use    Vaping status: Never Used   Substance and Sexual Activity    Alcohol use: Not Currently    Drug use: Never    Sexual activity:  Never     Birth control/protection: Pill, OCP   Other Topics Concern    [place] up on a farm No    History of tanning Yes    Outdoor occupation No    Breast feeding No    Reaction to local anesthetic No    Pt has a pacemaker No    Pt has a defibrillator No     Social Drivers of Health      [facility]    Housing Stability       FAMILY HISTORY:   Family History   Problem Relation Age of Onset    Diabetes Mother     Cancer Maternal Grandmother         ovarian       CURRENT MEDICATIONS:    Current Outpatient Medications   Medication Sig Dispense Refill    Diethylpropion HCl 25 MG Oral Tab Take 1 tablet by mouth 2 (two) times daily with meals. 60 tablet 3    ketoconazole 2 % External Shampoo Use 2 times weekly to shampoo for psoriasis 120 mL 11    spironolactone 50 MG Oral Tab Take 3 tablets (150 mg total) by mouth daily. For flares of acne- 270 tablet 1    Mometasone Furoate 0.1 % External Solution Use bid to scalp 60 mL 6    ketoconazole 2 % External Cream Apply to affected area 2 times daily. 60 g 2    cetirizine 10 MG Oral Tab Take 1 tablet (10 mg total) by mouth 2 (two) times daily. 60 tablet 5    Tretinoin 0.05 % External Cream Apply a small pea sized amount to areas of acne on the face as directed at bedtime. Use over serums, clindamycin and moistruizers 20 g 6    spironolactone 25 MG Oral Tab Take 1 tablet (25 mg total) by mouth daily. For continuous maintenance 90 tablet 1    sertraline 25 MG Oral Tab Take 1 tablet (25 mg total) by mouth daily. 90 tablet 0    Hydrocortisone 2.5 % External Lotion Apply bid to legs 120 mL 5    metFORMIN HCl 1000 MG Oral Tab Take 1 tablet (1,000 mg total) by mouth 2 (two) times daily with meals.      clindamycin 1 % External Lotion Apply 1 Application  topically 2 (two) times daily. Apply thin film to affected area(s). For acne 60 mL 6    spironolactone 100 MG Oral Tab Take 1 tablet (100 mg total) by mouth daily. 60  tablet 5    calcipotriene 0.005 % External Cream Apply 1 Application topically 2 (two) times daily. To psoriasis knees and to shoulders (Patient not taking: Reported on 12/19/2024) 60 g 12    ondansetron 4 MG Oral Tablet Dispersible Take 1 tablet (4 mg total) by mouth every 8 (eight) hours as needed for Nausea.      LEVOCETIRIZINE 5 MG Oral Tab TAKE 1 TABLET(5 MG) BY MOUTH EVERY EVENING 90 tablet 1       ALLERGIES:  No Known Allergies      ASSESSMENTS:     REVIEW OF SYSTEMS:  Constitutional: Negative for: weight change, fever, fatigue, cold/heat intolerance  Eyes: Negative for:  Visual changes, proptosis, blurring  ENT: Negative for:  dysphagia, neck swelling, dysphonia  Respiratory: Negative for:  dyspnea, cough  Cardiovascular: Negative for:  chest pain, palpitations, orthopnea  GI: Negative for:  abdominal pain, nausea, vomiting, diarrhea, constipation, bleeding  Neurology: Negative for: headache, numbness, weakness  Genito-Urinary: Negative for: dysuria, frequency  Psychiatric: Negative for:  depression, anxiety  Hematology/Lymphatics: Negative for: bruising, lower extremity edema  Endocrine: Negative for: polyuria, polydypsia  Skin: Negative for: rash, blister, cellulitis,      PHYSICAL EXAM:   Vitals:    02/04/25 1115   BP: 119/55   Pulse: 89   Weight: 249 lb (112.9 kg)   Height: 5' 4.2\" (1.631 m)         BMI: Body mass index is 42.47 kg/m².     CONSTITUTIONAL:  awake, alert, cooperative, no apparent distress, and appears stated age  PSYCH: normal affect  EYES:  No proptosis, no ptosis, conjunctiva normal  SKIN:  no bruising or bleeding, no rashes and no lesions  EXTREMITIES: no edema      DATA:   Reviewed    ASSESSMENT AND PLAN: This is a 24 year-old woman here for follow-up of management of PCOS that has been diagnosed based upon oligo-ovulatory cycles, and biochemical as well as clinical hyperandrogenism.     She will follow up with me in 3 months with new blood work. I would like to connect her with the  [facility] navigator, in an attempt to get her better mental health help.    Return to clinic in 3 months      Prior to this encounter, I spent over 15 minutes with preparing for the visit, including reviewing documents from other specialties as well as from PCP and going over test results. During the face to face encounter, I spent an additional 15 minutes which were determined for follow-up. Greater than 50% of the time was spent in counseling, anticipatory guidance, and coordination of care. Patient concerns were answered to the best of my knowledge.         2/05/25  Martha Mattson MD

## 2025-02-06 PROBLEM — F32.A DEPRESSION: Status: ACTIVE | Noted: 2025-02-06

## 2025-02-10 ENCOUNTER — TELEPHONE (OUTPATIENT)
Age: 25
End: 2025-02-10

## 2025-02-10 NOTE — TELEPHONE ENCOUNTER
Hello,     The [facility] Navigation team has attempted to reach you regarding an order from Dr. Mattson's office. We are reaching out in order to assist you in coordinating care and resources that may meet your needs. Please give our office a call at [phone]. For more immediate assistance you can contact our 24-hour help line at [phone]. We look forward to hearing from you soon.

## 2025-02-26 ENCOUNTER — TELEPHONE (OUTPATIENT)
Age: 25
End: 2025-02-26

## 2025-02-26 NOTE — TELEPHONE ENCOUNTER
I am reaching out from the [facility] Navigation department, following up on an order from your provider's office to assist in connecting you with resources for care. If you would like to discuss this further, please give us a call back at [phone], or for more immediate assistance you can contact our 24-hour help line at [phone]. We look forward to hearing from you soon.

## 2025-03-20 ENCOUNTER — OFFICE VISIT (OUTPATIENT)
Dept: DERMATOLOGY CLINIC | Facility: CLINIC | Age: 25
End: 2025-03-20

## 2025-03-20 DIAGNOSIS — Z51.81 MEDICATION MONITORING ENCOUNTER: ICD-10-CM

## 2025-03-20 DIAGNOSIS — L70.0 ACNE VULGARIS: ICD-10-CM

## 2025-03-20 DIAGNOSIS — L91.0 KELOID SCAR: ICD-10-CM

## 2025-03-20 DIAGNOSIS — L70.0 CYSTIC ACNE VULGARIS: Primary | ICD-10-CM

## 2025-03-20 DIAGNOSIS — E28.2 PCOS (POLYCYSTIC OVARIAN SYNDROME): ICD-10-CM

## 2025-03-20 DIAGNOSIS — L73.2 HIDRADENITIS SUPPURATIVA: ICD-10-CM

## 2025-03-20 DIAGNOSIS — L50.3 DERMATOGRAPHISM: ICD-10-CM

## 2025-03-20 DIAGNOSIS — L40.0 PSORIASIS VULGARIS: ICD-10-CM

## 2025-03-20 PROCEDURE — 99214 OFFICE O/P EST MOD 30 MIN: CPT | Performed by: DERMATOLOGY

## 2025-03-20 RX ORDER — NORETHINDRONE ACETATE AND ETHINYL ESTRADIOL 1MG-20(21)
KIT ORAL
COMMUNITY
Start: 2025-02-06

## 2025-03-20 RX ORDER — AMMONIUM LACTATE 12 G/100G
1 LOTION TOPICAL 2 TIMES DAILY
Qty: 400 ML | Refills: 11 | Status: SHIPPED | OUTPATIENT
Start: 2025-03-20 | End: 2025-04-19

## 2025-03-20 RX ORDER — CLINDAMYCIN PHOSPHATE 10 UG/ML
1 LOTION TOPICAL 2 TIMES DAILY
Qty: 60 ML | Refills: 3 | Status: SHIPPED | OUTPATIENT
Start: 2025-03-20 | End: 2025-04-19

## 2025-03-20 NOTE — PROGRESS NOTES
The following individual(s) verbally consented to be recorded using ambient AI listening technology and understand that they can each withdraw their consent to this listening technology at any point by asking the clinician to turn off or pause the recording:    Patient name: Candace Boyd  Additional names:

## 2025-03-24 NOTE — PROGRESS NOTES
Candace Boyd is a 24 year old female.    CC:    Chief Complaint   Patient presents with    Acne     LOV 12/2024.  Pt presents for facial acne f/u.   Face acne, breaks out with food triggers.  New breakout to the breast, has tried the tretinoin- will peel and cause irritation.  Pt does use deodorant in this area  RX tretinoin and spironolactone PRN    Psoriasis     Pt presents for scalp psoriasis f/u.  Well controlled with topicals.   RX ketoconazole shampoo/cream, mometasone solution, and zytrec          HISTORY:    History reviewed. No pertinent past medical history.   History reviewed. No pertinent surgical history.   Family History   Problem Relation Age of Onset    Diabetes Mother     Cancer Maternal Grandmother         ovarian      Social History     Socioeconomic History    Marital status: Single   Tobacco Use    Smoking status: Never     Passive exposure: Never    Smokeless tobacco: Never   Vaping Use    Vaping status: Never Used   Substance and Sexual Activity    Alcohol use: Not Currently    Drug use: Never    Sexual activity: Never     Birth control/protection: Pill, OCP   Other Topics Concern    Grew up on a farm No    History of tanning Yes    Outdoor occupation No    Breast feeding No    Reaction to local anesthetic No    Pt has a pacemaker No    Pt has a defibrillator No     Social Drivers of Health     Food Insecurity: No Food Insecurity (2/11/2025)    Received from [facility]    Food Insecurity     Currently or in the past 3 months, have you worried your food would run out before you had money to buy more?: No     In the past 12 months, have you run out of food or been unable to get more?: No   Transportation Needs: No Transportation Needs (2/11/2025)    Received from [facility]    Transportation Needs     Currently or in the past 3 months, has lack of transportation kept you from medical appointments, getting food or medicine, or providing care to a family  member?: Unrecognized value     Medical Transportation Needs?: No    [facility]    Housing Stability        Current Outpatient Medications   Medication Sig Dispense Refill    BLISOVI FE 1/20 1-20 MG-MCG Oral Tab TAKE 1 TABLET BY MOUTH EVERY DAY. SKIP PLACEBO WEEK AND START NEW PACK IMMEDIATELY. TAKE PLACEBO PILLS AFTER THE 3RD CHAI      clindamycin 1 % External Lotion Apply 1 Application topically 2 (two) times daily for 60 doses. Apply thin film to affected area(s). 60 mL 3    ammonium lactate 12 % External Lotion Apply 1 Application topically 2 (two) times daily for 60 doses. 400 mL 11    ketoconazole 2 % External Shampoo Use 2 times weekly to shampoo for psoriasis 120 mL 11    spironolactone 50 MG Oral Tab Take 3 tablets (150 mg total) by mouth daily. For flares of acne- 270 tablet 1    Mometasone Furoate 0.1 % External Solution Use bid to scalp 60 mL 6    ketoconazole 2 % External Cream Apply to affected area 2 times daily. 60 g 2    cetirizine 10 MG Oral Tab Take 1 tablet (10 mg total) by mouth 2 (two) times daily. 60 tablet 5    Tretinoin 0.05 % External Cream Apply a small pea sized amount to areas of acne on the face as directed at bedtime. Use over serums, clindamycin and moistruizers 20 g 6    sertraline 25 MG Oral Tab Take 1 tablet (25 mg total) by mouth daily. 90 tablet 0    metFORMIN HCl 1000 MG Oral Tab Take 1 tablet (1,000 mg total) by mouth 2 (two) times daily with meals.      ondansetron 4 MG Oral Tablet Dispersible Take 1 tablet (4 mg total) by mouth every 8 (eight) hours as needed for Nausea.      LEVOCETIRIZINE 5 MG Oral Tab TAKE 1 TABLET(5 MG) BY MOUTH EVERY EVENING 90 tablet 1    spironolactone 25 MG Oral Tab Take 1 tablet (25 mg total) by mouth daily. For continuous maintenance (Patient not taking: Reported on 3/20/2025) 90 tablet 1    Hydrocortisone 2.5 % External Lotion Apply bid to legs (Patient not taking: Reported on  3/20/2025) 120 mL 5    clindamycin 1 % External Lotion Apply 1 Application  topically 2 (two) times daily. Apply thin film to affected area(s). For acne (Patient not taking: Reported on 3/20/2025) 60 mL 6    spironolactone 100 MG Oral Tab Take 1 tablet (100 mg total) by mouth daily. (Patient not taking: Reported on 3/20/2025) 60 tablet 5    calcipotriene 0.005 % External Cream Apply 1 Application topically 2 (two) times daily. To psoriasis knees and to shoulders (Patient not taking: Reported on 3/20/2025) 60 g 12     Allergies:   Allergies[1]    History reviewed. No pertinent past medical history.  History reviewed. No pertinent surgical history.  Social History     Socioeconomic History    Marital status: Single     Spouse name: Not on file    Number of children: Not on file    Years of education: Not on file    Highest education level: Not on file   Occupational History    Not on file   Tobacco Use    Smoking status: Never     Passive exposure: Never    Smokeless tobacco: Never   Vaping Use    Vaping status: Never Used   Substance and Sexual Activity    Alcohol use: Not Currently    Drug use: Never    Sexual activity: Never     Birth control/protection: Pill, OCP   Other Topics Concern    Grew up on a farm No    History of tanning Yes    Outdoor occupation No    Breast feeding No    Reaction to local anesthetic No    Pt has a pacemaker No    Pt has a defibrillator No   Social History Narrative    Not on file     Social Drivers of Health     Food Insecurity: No Food Insecurity (2/11/2025)    Received from [facility]    Food Insecurity     Currently or in the past 3 months, have you worried your food would run out before you had money to buy more?: No     In the past 12 months, have you run out of food or been unable to get more?: No   Transportation Needs: No Transportation Needs (2/11/2025)    Received from [facility]    Transportation Needs     Currently or in the past 3  months, has lack of transportation kept you from medical appointments, getting food or medicine, or providing care to a family member?: Unrecognized value     : Not on file     Medical Transportation Needs?: No     Daily Living Transportation Needs? [Peds Only] : Not on file   Stress: Not on file   Housing Stability: Low Risk  (7/3/2023)    [facility]    Housing Stability     Mortgage Payment Concerns?: Not on file     Number of Places Lived in the Last Year: Not on file     Unstable Housing?: Not on file     Family History   Problem Relation Age of Onset    Diabetes Mother     Cancer Maternal Grandmother         ovarian       HPI:     HPI:  Chief Complaint   Patient presents with    Acne     LOV 12/2024.  Pt presents for facial acne f/u.   Face acne, breaks out with food triggers.  New breakout to the breast, has tried the tretinoin- will peel and cause irritation.  Pt does use deodorant in this area  RX tretinoin and spironolactone PRN    Psoriasis     Pt presents for scalp psoriasis f/u.  Well controlled with topicals.   RX ketoconazole shampoo/cream, mometasone solution, and zytrec      Follow-up acne, new cystic nodule on left cheek, overall has noted still scars, inflammatory lesions.  Diet has particularly been problematic with triggering flareups especially cheese, dairy and gluten.  Has been using tretinoin and noticed difficulty tolerating this due to dryness in the winter.  Has been taking spironolactone more intermittently for 2 to 3 days at a time with flareups.  Generally has to take this every couple of weeks.  Has noted less stress now will be finishing her associates degree.    Has noted scalp generally doing well with psoriasis under control with ketoconazole shampoo mometasone Zyrtec.    Has been using event balm which has helped with dryness on the face feels acne postinflammatory changes in particular have improved with this.   Continues with her usual skin care regimen.    Otherwise nothing new or different [facility]  Labs from earlier this year reviewed hemoglobin A1c 5.8 renal liver functions potassium normal insulin level elevated at 33.7 platelets elevated previously  DHEA-S mid to upper range in 2023 and testosterone free and weakly bound elevated    No personal  or family history of skin cancer      History of Present Illness  Candace Boyd is a 24 year old female with acne and psoriasis who presents with worsening chest acne and skin concerns.    She has worsening acne on her chest, characterized by acne scars and a current weinstein. Tretinoin use led to increased irritation, prompting discontinuation. She experiences sweating under her breasts and uses deodorant to manage odor. Her medication regimen, including spironolactone, has been inconsistent, though she attempts daily adherence. Clindamycin lotion is available, and she plans to apply it twice daily on her chest.    She has a history of psoriasis affecting her knees and elbows. She uses a prescribed topical treatment for her knees but does not recall the specific medication. No over-the-counter treatments have been used for her knees. Her scalp psoriasis is stable with the use of prescribed shampoo and topical solutions. No current issues with her scalp.    She mentions keratosis pilaris on her legs, which she manages with salicylic acid pads and dry brushing. She is exploring various lotions for skin hydration and discoloration, including a product from [employer], but has not observed significant changes.    She experiences seasonal allergies, which have been exacerbated by recent weather changes, but she has obtained a refill for her allergy medication.      Patient presents with concerns above.    Patient has been in their usual state of health.     Past notes/ records and appropriate/relevant lab results including pathology and past body maps reviewed. Including  outside notes/ PCP notes as appropriate. Updated and new information noted in current visit.     ROS:  Denies other relevant systemic complaints. See HPI.     History, medications, allergies reviewed as noted.    Allergies:  Patient has no known allergies.      There were no vitals filed for this visit.    Physical Examination:     Well-developed well-nourished patient alert oriented in no acute distress.  Exam performed of appropriate involved areas    See map today's date for lesions noted .  See assessment and plan below for specific lesions.    Otherwise remarkable for lesions as noted on map.    See A/P  below for additional information:    Assessment / plan:    No orders of the defined types were placed in this encounter.      Meds & Refills for this Visit:  Requested Prescriptions     Signed Prescriptions Disp Refills    clindamycin 1 % External Lotion 60 mL 3     Sig: Apply 1 Application topically 2 (two) times daily for 60 doses. Apply thin film to affected area(s).    ammonium lactate 12 % External Lotion 400 mL 11     Sig: Apply 1 Application topically 2 (two) times daily for 60 doses.         Encounter Diagnoses   Name Primary?    Cystic acne vulgaris Yes    Acne vulgaris     Keloid scar     Psoriasis vulgaris     Medication monitoring encounter     Dermatographism     PCOS (polycystic ovarian syndrome)     Hidradenitis suppurativa              Physical Exam        Results        Assessment & Plan  Folliculitis  Less likely at bedtime lesions on chest  Folliculitis on the chest, likely due to friction and sweating, exacerbated by tretinoin-induced irritation. Acne scars and a current weinstein are present. Antiperspirants and deodorants may contribute to irritation. Prescription antiperspirants like Drysol or Hypercare are too irritating, and tretinoin is not recommended.  - Apply clindamycin lotion twice daily to reduce inflammation and bacterial load.  - Avoid tretinoin on the chest due to  irritation.  - Consider salicylic acid moisturizers for acne marks.  - Avoid prescription antiperspirants like Drysol or Hypercare.    Psoriasis  Psoriasis on the knees and elbows. She has a topical treatment for the knees but does not recall the medication. No over-the-counter treatments are used for the knees.  - Provide samples of topical treatments for psoriasis on the knees and elbows.  - Continue current topical treatment for the knees if effective.    Keratosis Pilaris  Keratosis Pilaris on the legs. She has tried dry brushing and salicylic acid pads. Preparing for a wedding and summer, she seeks improved skin texture. Salicylic acid pads may be too drying; lactic acid products like Lac-Hydrin are suggested for larger areas.  - Consider salicylic acid lotion for KP, cautioning against over-drying.  - Explore lactic acid products like Lac-Hydrin for larger areas.    Seborrheic Dermatitis  Seborrheic dermatitis on the scalp is well-managed with ketoconazole shampoo and topical solution.  - Continue ketoconazole shampoo and topical solution.    Allergic Rhinitis  Allergies exacerbated by weather changes. She has obtained a refill for allergy medication.  - Continue allergy medication as needed.    Recording duration: 7 minutes      Acne. See medications in grid.  Skin care regimen discussed at length including cleansers, makeup, face washing, sunscreen.  Recheck in 6 -8 weeks if no improvement.  Notify us promptly if problems tolerating regimen.  Consider more aggressive therapy if not responding.  Insetting of insulin resistance, PCOS  Encouraged more consistent spironolactone 25 mg daily may increase as needed with flare continue topicals tretinoin has been helpful continue.  Using a fan balm which has been helping.  Continue monitoring.  Continue working on control PCOS and insulin levels.       Psoriasis.  Plaque-type.  10% body surface involvement.  Will treat with medications and grid.  Overall skincare,  liberal use of emollients discussed.  Consider more aggressive therapy if worsening.Patient will let us know how they are doing over the next several weeks.  Await clinical response to above therapy.  Recheck in 2-3 months if no improvement.  Overall stable continue topicals  Continue ketoconazole shampoo, topical steroids.  She does have clobetasol cream at home may continue with this.  Notes some scaling on hand and on feet ketoconazole to feet, and webspaces.  Use clobetasol to itchy areas on feet and hands  Continue cetirizine for dermographism    Benign-appearing nevi, no atypical features on dermoscopy reassurance given monitor.        No other susupicious lesions on todays  exam.    Please refer to map for specific lesions.  See additional diagnoses.  Pros cons of various therapies, risks benefits discussed.Pathophysiology, terapeutic options reviewed.  See  Medications in grid.  Instructions reviewed at length.    Reassurance regarding other benign skin lesions.    Monitor for new or changing lesions. Signs and symptoms of skin cancer, ABCDE's of melanoma ( additional information available at AAD.org, skincancer.org) Encourage Sunscreen (broad-spectrum, ideally mineral-based-UVA/UVB -SPF 30 or higher) use encouraged, sun protection/sun protective clothing, self exams reviewed Followup as noted RTC ---routine checkup 6 mos -one year or p.r.n.    Encounter Times   Including precharting, reviewing chart, prior notes obtaining history, medical exam, notes on body map, plan, counseling, discussion of therapeutic options, patient education, orders more than half total time spent in face to face time with patient.  My total time spent caring for the patient on the day of the encounter: 35 minutes       The patient indicates understanding of these issues and agrees to the plan.  The patient is asked to return as noted in follow-up/ above.    This note was generated using Dragon voice recognition software.  Please  contact me regarding any confusion resulting from errors in recognition..  Note to patient and family: The 21st Century Cures Act makes medical notes like these available to patients. However, be advised this is a medical document. It is intended as [id number] communication and monitoring of a patient's care needs. It is written in medical language and may contain abbreviations or verbiage that are unfamiliar. It may appear blunt or direct. Medical documents are intended to carry relevant information, facts as evident and the clinical opinion of the practitioner.         [1] No Known Allergies

## 2025-05-22 ENCOUNTER — OFFICE VISIT (OUTPATIENT)
Dept: SURGERY | Facility: CLINIC | Age: 25
End: 2025-05-22
Payer: MEDICAID

## 2025-05-22 VITALS
SYSTOLIC BLOOD PRESSURE: 120 MMHG | BODY MASS INDEX: 41.94 KG/M2 | DIASTOLIC BLOOD PRESSURE: 80 MMHG | WEIGHT: 245.63 LBS | HEART RATE: 80 BPM | HEIGHT: 64.2 IN | OXYGEN SATURATION: 96 %

## 2025-05-22 DIAGNOSIS — E78.5 DYSLIPIDEMIA: Primary | ICD-10-CM

## 2025-05-22 DIAGNOSIS — E66.9 OBESITY (BMI 30-39.9): ICD-10-CM

## 2025-05-22 DIAGNOSIS — E66.01 MORBID OBESITY WITH BMI OF 40.0-44.9, ADULT (HCC): ICD-10-CM

## 2025-05-22 DIAGNOSIS — E28.2 PCOS (POLYCYSTIC OVARIAN SYNDROME): ICD-10-CM

## 2025-05-22 DIAGNOSIS — F41.8 DEPRESSION WITH ANXIETY: ICD-10-CM

## 2025-05-22 DIAGNOSIS — R63.2 BINGE EATING: ICD-10-CM

## 2025-05-22 DIAGNOSIS — E88.819 INSULIN RESISTANCE: ICD-10-CM

## 2025-05-22 PROCEDURE — 99214 OFFICE O/P EST MOD 30 MIN: CPT | Performed by: NURSE PRACTITIONER

## 2025-05-22 RX ORDER — DIETHYLPROPION HYDROCHLORIDE 75 MG/1
1 TABLET, EXTENDED RELEASE ORAL EVERY MORNING
Qty: 30 TABLET | Refills: 3 | Status: SHIPPED | OUTPATIENT
Start: 2025-05-22 | End: 2025-05-22

## 2025-05-22 RX ORDER — DIETHYLPROPION HYDROCHLORIDE 75 MG/1
1 TABLET, EXTENDED RELEASE ORAL EVERY MORNING
Qty: 30 TABLET | Refills: 3 | Status: SHIPPED | OUTPATIENT
Start: 2025-05-22

## 2025-05-22 NOTE — PROGRESS NOTES
[facility]  [address]  [facility] [address]  Dept: [phone]       Patient:  Candace Boyd  :      2000  MRN:      [id number]    Chief Complaint:    Chief Complaint   Patient presents with    Follow - Up    Obesity    Weight Management       SUBJECTIVE     History of Present Illness:  Candace is being seen today for a follow-up for weight management.    Will start [employer].     BP elevated.     Initial HPI:  24 yo female.   Presents to clinic for assistance with weight loss/maintenance.   Reports first noticing weight gain during puberty.   Hx PCOS.   Describes nausea regularly.   Recently started Vyvanse- has lost close to 10 lbs. Also takes/tolerating metformin.     Patient is considering medications and is not considering bariatric surgery for weight loss.    Patient has history of eating disorder(s).   Binge Eating.    Patient is in college, works a student job.  Patient lives with dad, Theresa (grandma).    Patient's goal weight:   Biggest weight loss in the past: minimal   How weight loss was achieved: exercise   Heaviest weight ever:  Previous use of medical weight loss medications:    Physical activity:   Recently stopped    Sleep: 5-10 hours/night    Sleep screening:       Past Medical History: History reviewed. No pertinent past medical history.     Comorbidities:  Hyperlipidemia-Improvement?  yes    OBJECTIVE     Vitals: /80 (BP Location: Right arm, Patient Position: Sitting, Cuff Size: large)   Pulse 80   Ht 5' 4.2\" (1.631 m)   Wt 245 lb 9.6 oz (111.4 kg)   LMP 2024 (Approximate)   SpO2 96%   BMI 41.89 kg/m²     Initial weight loss: -03   Total weight loss: +12   Start weight: 234    Wt Readings from Last 6 Encounters:   25 245 lb 9.6 oz (111.4 kg)   25 249 lb (112.9 kg)   25 248 lb (112.5 kg)   24 240 lb (108.9 kg)   07/15/24 236 lb (107 kg)   24 236 lb  12.8 oz (107.4 kg)       Patient Medications:    Current Outpatient Medications   Medication Sig Dispense Refill    Diethylpropion HCl ER 75 MG Oral Tablet 24 Hr Take 1 tablet (75 mg total) by mouth every morning. 30 tablet 3    Diethylpropion HCl ER 75 MG Oral Tablet 24 Hr Take 1 tablet (75 mg total) by mouth every morning. 30 tablet 3    BLISOVI FE 1/20 1-20 MG-MCG Oral Tab TAKE 1 TABLET BY MOUTH EVERY DAY. SKIP PLACEBO WEEK AND START NEW PACK IMMEDIATELY. TAKE PLACEBO PILLS AFTER THE 3RD CHAI      ketoconazole 2 % External Shampoo Use 2 times weekly to shampoo for psoriasis 120 mL 11    spironolactone 50 MG Oral Tab Take 3 tablets (150 mg total) by mouth daily. For flares of acne- 270 tablet 1    Mometasone Furoate 0.1 % External Solution Use bid to scalp 60 mL 6    ketoconazole 2 % External Cream Apply to affected area 2 times daily. 60 g 2    cetirizine 10 MG Oral Tab Take 1 tablet (10 mg total) by mouth 2 (two) times daily. 60 tablet 5    Tretinoin 0.05 % External Cream Apply a small pea sized amount to areas of acne on the face as directed at bedtime. Use over serums, clindamycin and moistruizers 20 g 6    spironolactone 25 MG Oral Tab Take 1 tablet (25 mg total) by mouth daily. For continuous maintenance (Patient not taking: Reported on 3/20/2025) 90 tablet 1    sertraline 25 MG Oral Tab Take 1 tablet (25 mg total) by mouth daily. 90 tablet 0    Hydrocortisone 2.5 % External Lotion Apply bid to legs (Patient not taking: Reported on 3/20/2025) 120 mL 5    metFORMIN HCl 1000 MG Oral Tab Take 1 tablet (1,000 mg total) by mouth 2 (two) times daily with meals.      clindamycin 1 % External Lotion Apply 1 Application  topically 2 (two) times daily. Apply thin film to affected area(s). For acne (Patient not taking: Reported on 3/20/2025) 60 mL 6    spironolactone 100 MG Oral Tab Take 1 tablet (100 mg total) by mouth daily. (Patient not taking: Reported on 3/20/2025) 60 tablet 5    calcipotriene 0.005 % External  Cream Apply 1 Application topically 2 (two) times daily. To psoriasis knees and to shoulders (Patient not taking: Reported on 3/20/2025) 60 g 12    ondansetron 4 MG Oral Tablet Dispersible Take 1 tablet (4 mg total) by mouth every 8 (eight) hours as needed for Nausea.      LEVOCETIRIZINE 5 MG Oral Tab TAKE 1 TABLET(5 MG) BY MOUTH EVERY EVENING 90 tablet 1     Allergies:  Patient has no known allergies.     Social History: Reviewed     Surgical History:  History reviewed. No pertinent surgical history.  Family History:    Family History   Problem Relation Age of Onset    Diabetes Mother     Cancer Maternal Grandmother         ovarian     Initial Intake:  After dinner behavior: -  Night eating: -  Portion sizes: +  Binge: +  Emotional: +  Depression: Score: 17  Grazing:   Sweet tooth:  Crunchy/salty:  Etoh: rare binge drink episodes since turning age 23  Drinks water, coffee  Soda Drinker: Yes                 If yes, how much?:  Rare  Sports Drinks:  No                  Juice:  No                     Number of restaurant or fast food meals/week:  meals/week    Food Journal  Reviewed and Discussed:       Patient has a Food Journal?: yes My Net Diary, 28 cycle based  Patient is reading nutrition labels?  yes  Average Caloric Intake:   1800  Average CHO Intake:   Is patient exercising? no  Type of exercise?   Walks once/week- 1.5 mile walk      Eating Habits  Patient states the following:  Eats 3 meal(s) per day  Length of time it takes to consume a meal:    # of snacks per day:    Type of snacks:    Amount of soda consumption per day:    Amount of water (in ounces) per day:    Toughest challenge: food, exercise, dairy free ice cream        Nutritional Goals  Eat 3-4 cups of fresh fruits or vegetables daily    Behavior Modifications Reviewed and Discussed  Eat breakfast, Eat 3 meals per day, Plan meals in advance, Read nutrition labels, Drink 64 oz of water per day, Maintain a daily food journal, Utlize portion control  strategies to reduce calorie intake, Identify triggers for eating and manage cues, and Eat slowly and take 20 to 30 minutes to complete each meal    Exercise Goals Reviewed and Discussed    Aim for 150 minutes moderate level exercise weekly with 2-3 days strength training as tolerated    ROS:    Constitutional: positive for fatigue  Respiratory: negative  Cardiovascular: negative  Gastrointestinal: negative  Integument/breast: positive for acne   Hematologic/lymphatic: negative  Musculoskeletal:positive for arthralgias  Neurological: negative  Behavioral/Psych: positive for anxiety and depression  Endocrine: negative  All other systems were reviewed and are negative    Physical Exam:  General appearance: alert, appears stated age, cooperative and obese  Head: Normocephalic, without obvious abnormality, atraumatic  Neck: no adenopathy, no carotid bruit, no JVD, supple, symmetrical, trachea midline and thyroid not enlarged, symmetric, no tenderness/mass/nodules  Lungs: clear to auscultation bilaterally  Heart: S1, S2 normal, no murmur, click, rub or gallop, regular rate and rhythm  Abdomen: soft, non-tender; bowel sounds normal; no masses,  no organomegaly and abdomen obese   Extremities: intact, no edema   Pulses: 2+ and symmetric  Skin: intact   Neurologic: Grossly normal       ASSESSMENT       Encounter Diagnosis(ses):   Encounter Diagnoses   Name Primary?    Dyslipidemia Yes    Obesity (BMI 30-39.9)     Binge eating     Morbid obesity with BMI of 40.0-44.9, adult (HCC)     Insulin resistance     PCOS (polycystic ovarian syndrome)     Depression with anxiety            PLAN         Diagnoses and all orders for this visit:    Dyslipidemia    Obesity (BMI 30-39.9)  -     Diethylpropion HCl ER 75 MG Oral Tablet 24 Hr; Take 1 tablet (75 mg total) by mouth every morning.    Binge eating    Morbid obesity with BMI of 40.0-44.9, adult (HCC)  -     Diethylpropion HCl ER 75 MG Oral Tablet 24 Hr; Take 1 tablet (75 mg total)  by mouth every morning.    Insulin resistance    PCOS (polycystic ovarian syndrome)    Depression with anxiety      DYSLIPIDEMIA: Recommend dietary changes and lifestyle modifications as discussed below. Monitor.      Lab Results   Component Value Date/Time    CHOLEST 217 (H) 02/02/2025 10:39 AM     (H) 02/02/2025 10:39 AM    HDL 68 (H) 02/02/2025 10:39 AM    TRIG 295 (H) 02/02/2025 10:39 AM    VLDL 50 (H) 02/02/2025 10:39 AM     OBESITY/WEIGHT GAIN:     Recommended patient continue intensive lifestyle and behavioral modifications at this time for weight loss.     Reviewed lifestyle modifications: Whole Food/Plant Strong/Low Glycemic Index diet, moderate alcohol consumption, reduced sodium intake to no more than 2,400 mg/day, and at least 150 minutes of moderate physical activity per week.    Avoid processed, poor quality carbohydrates, refined grains, flour, sugar.     Goals for next month:  1. Keep a food log.  2. Drink 64 ounces of non-caloric beverages per day. No fruit juices or regular soda.  3. Aim for 150 minutes moderate exercise per week.    4. Increase fruit and vegetable servings to 5-6 per day.    5. Improve sleep and stress.      Reviewed other labs:  7/6/23- a1c 5.9.  10/25/23- a1c 5.6. .   Fasting insulin elevated.   2/2/25- a1c 6.1- prediabetes.   Fasting insulin elevated.   FBS elevated.      Discussed medication options for weight loss in detail with patient.      Stopped wellbutrin.  Has started sertraline since last visit.  Neurofeedback.    Continue metformin (ordered by endo).    Last dose of Vyanse May 2024.  Holding off on Vyvanse- too stimulating.     Tolerating Diethylpropion- increase to 75 mg in the AM.     Discussed risks, benefits, rationale, and side effects of medication(s).      Lifestyle Medicine Assessment.  Healthy Plate Method.  Daily Inactive Calories: 220-249 lbs: 1800 calories/day.   Aim for 90 grams protein/day.  Improve breakfast to whole food,  non-processed.     Increase walking.    BP initially elevated. Repeated and in healthy range. Will monitor for now.     RTC 4 months.      MYRNA Ng

## 2025-06-04 ENCOUNTER — APPOINTMENT (OUTPATIENT)
Dept: GENERAL RADIOLOGY | Age: 25
End: 2025-06-04
Payer: MEDICAID

## 2025-06-04 ENCOUNTER — HOSPITAL ENCOUNTER (OUTPATIENT)
Age: 25
Discharge: HOME | End: 2025-06-04
Payer: MEDICAID

## 2025-06-04 ENCOUNTER — NURSE TRIAGE (OUTPATIENT)
Dept: FAMILY MEDICINE CLINIC | Facility: CLINIC | Age: 25
End: 2025-06-04

## 2025-06-04 VITALS
DIASTOLIC BLOOD PRESSURE: 90 MMHG | OXYGEN SATURATION: 99 % | HEART RATE: 93 BPM | SYSTOLIC BLOOD PRESSURE: 151 MMHG | TEMPERATURE: 98 F | RESPIRATION RATE: 18 BRPM

## 2025-06-04 DIAGNOSIS — T14.8XXA ABRASION: ICD-10-CM

## 2025-06-04 DIAGNOSIS — M25.569 KNEE PAIN: ICD-10-CM

## 2025-06-04 DIAGNOSIS — S83.92XA SPRAIN OF LEFT KNEE, UNSPECIFIED LIGAMENT, INITIAL ENCOUNTER: Primary | ICD-10-CM

## 2025-06-04 PROCEDURE — 99213 OFFICE O/P EST LOW 20 MIN: CPT

## 2025-06-04 PROCEDURE — 73560 X-RAY EXAM OF KNEE 1 OR 2: CPT

## 2025-06-04 PROCEDURE — A6450 LT COMPRES BAND >=5"/YD: HCPCS

## 2025-06-04 NOTE — DISCHARGE INSTRUCTIONS
There is no fracture on your x-ray.  You likely have a sprain of your knee.  Wear the ace wrap for comfort.  Rest, ice and elevate.  Take Tylenol and Motrin for pain as needed.    Please make an appointment with the orthopedic specialist as discussed as you might need physical therapy and/or an MRI to evaluate for further injury.    Please wash the abrasion 2-3 times a day with plain soap and water (no perfumes or dyes).  Please put a small amount of antibiotic ointment on the abrasion and keep it covered while you are out and about.  Is important to have the wound covered for part of the day and have it open to air part of the day to promote good healing.    If you develop any numbness, tingling, acutely worsening pain, swelling or any other concerning complaints you should go to the emergency department.  If you have persistent pain for more than the next week make an appointment to see the orthopedic specialist.  Otherwise follow-up with your primary care doctor.

## 2025-06-04 NOTE — TELEPHONE ENCOUNTER
Action Requested: Summary for Provider     []  Critical Lab, Recommendations Needed  [] Need Additional Advice  []   FYI    []   Need Orders  [] Need Medications Sent to Pharmacy  []  Other     SUMMARY: Patient advised immediate care.     Patient fell a few days ago in [facility], scraped her knee, bruised her arm. Patient states scrape looks infected, \"bubbles\" on her knee. Patient states she had previous meniscus tear in that knee and now it is starting to act up on and off. No fever of spreading redness.     Reason for call: Knee Pain  Onset: worsening today  Reason for Disposition   Looks infected (spreading redness, red streak, pus) and fever    Protocols used: Wound Infection [id number]

## 2025-06-04 NOTE — ED PROVIDER NOTES
Patient Seen in: [facility]      History     Chief Complaint   Patient presents with    Leg or Foot Injury     Stated Complaint: L Knee Laceration  Subjective:   Candace is a 24-year-old female presenting to the immediate care complaining of pain to her left knee.  Patient states that 3 days ago she was in [place] and she had a mechanical fall on some uneven sidewalk.  Patient states that she landed on her knees and braced herself with her upper arms.  Patient states that she was concerned about the large abrasion on her left knee and the swelling of the left knee.  States that she also has some pain to her left knee when she pushes on it.  Denies any weakness, numbness, tingling to her feet.  No pain to her upper extremities.  Patient did not hit her head or have a loss of consciousness.  She denies any head, neck or back pain.  Patient is otherwise feeling well.  She has no other complaints or concerns.        Objective:   Past Medical History:    PCOS (polycystic ovarian syndrome)    Prediabetes            History reviewed. No pertinent surgical history.           Social History     Socioeconomic History    Marital status: Single   Tobacco Use    Smoking status: Never     Passive exposure: Never    Smokeless tobacco: Never   Vaping Use    Vaping status: Never Used   Substance and Sexual Activity    Alcohol use: Not Currently    Drug use: Never    Sexual activity: Never     Birth control/protection: Pill, OCP   Other Topics Concern    Grew up on a farm No    History of tanning Yes    Outdoor occupation No    Breast feeding No    Reaction to local anesthetic No    Pt has a pacemaker No    Pt has a defibrillator No     Social Drivers of Health     Food Insecurity: No Food Insecurity (2/11/2025)    Received from [facility]    Food Insecurity     Currently or in the past 3 months, have you worried your food would run out before you had money to buy more?: No     In the past 12  months, have you run out of food or been unable to get more?: No   Transportation Needs: No Transportation Needs (2/11/2025)    Received from [facility]    Transportation Needs     Currently or in the past 3 months, has lack of transportation kept you from medical appointments, getting food or medicine, or providing care to a family member?: No     Medical Transportation Needs?: No    Received from [facility]    Housing Stability            Review of Systems    Positive for stated complaint: Leg or Foot Injury    Other systems are as noted in HPI.  Constitutional and vital signs reviewed.      All other systems reviewed and negative except as noted above.    Physical Exam     ED Triage Vitals [06/04/25 1158]   /90   Pulse 93   Resp 18   Temp 98.3 °F (36.8 °C)   Temp src Oral   SpO2 99 %   O2 Device None (Room air)     Current:/90   Pulse 93   Temp 98.3 °F (36.8 °C) (Oral)   Resp 18   LMP 05/01/2025 (Exact Date)   SpO2 99%     Physical Exam  Vitals and nursing note reviewed.   Constitutional:       General: She is not in acute distress.     Appearance: Normal appearance. She is not ill-appearing, toxic-appearing or diaphoretic.   HENT:      Head: Normocephalic.   Cardiovascular:      Rate and Rhythm: Normal rate.   Pulmonary:      Effort: Pulmonary effort is normal.   Musculoskeletal:         General: Normal range of motion.      Cervical back: Normal range of motion.      Right knee: No swelling, deformity, effusion, erythema, ecchymosis, lacerations or bony tenderness. Normal range of motion. No tenderness. Normal pulse.      Left knee: Swelling present. No deformity, effusion, erythema, ecchymosis, lacerations or bony tenderness. Normal range of motion. No tenderness. Normal pulse.      Instability Tests: Medial Antonia test negative and lateral Antonia test negative.      Comments: Positive CMS.  2+ DP and PT pulses.  Capillary refill less than 2 seconds.   Patient has full range of motion to bilateral knees.  Full range of motion to bilateral lower extremities.  Bilateral lower legs, thighs, calves, hips, ankles and feet are unremarkable.  Patient has no calf pain, tenderness, swelling, erythema or warmth.  Patient does have scabbed over abrasions to both of her anterior knees.  No surrounding erythema.  No abscesses.  No signs of cellulitis.  No lacerations noted.  No ecchymosis noted.  No obvious deformity noted.  No drainage or discharge noted.  Patient has tenderness to the medial and lateral aspects of the knee with palpation.  Also has some tenderness over the abrasion.  Negative medial and lateral Antonia's test.     Skin:     General: Skin is warm and dry.      Capillary Refill: Capillary refill takes less than 2 seconds.   Neurological:      General: No focal deficit present.      Mental Status: She is alert and oriented to person, place, and time.   Psychiatric:         Mood and Affect: Mood normal.         Behavior: Behavior normal.         Thought Content: Thought content normal.         Judgment: Judgment normal.         ED Course     Radiology:    XR KNEE (1 OR 2 VIEWS), LEFT (CPT=73560)   Final Result   PROCEDURE: XR KNEE (1 OR 2 VIEWS), LEFT (CPT=73560)       COMPARISON: None.       INDICATIONS: Anterior left knee pain and abraision post fall 4 days ago.       TECHNIQUE: 2 views were obtained.         FINDINGS:    BONES: Normal. No significant arthropathy, fracture or acute abnormality.   SOFT TISSUES: Negative. No visible soft tissue swelling.    EFFUSION: None visible.    OTHER: Negative.                    =====   CONCLUSION: No acute fracture/dislocation.               Dictated by (CST): Pedro Harley MD on 6/04/2025 at 12:30 PM        Finalized by (CST): Pedro Harley MD on 6/04/2025 at 12:30 PM                 Labs Reviewed - No data to display    MDM     Medical Decision Making  Differential diagnoses reflecting the complexity of care  include but are not limited to bony versus soft tissue injury to the left knee, cellulitis, abrasions.    Comorbidities that add complexity to management include: None  History obtained by an independent source was from: Patient  My independent interpretations of studies include: X-ray  Patient is well appearing, non-toxic and in no acute distress.  Vital signs are stable.     There is no fracture on x-ray per the radiology read.  Patient's history and physical exam are consistent with a sprain.  Patient also has abrasions to both of her knees.  No signs of infection or cellulitis.  Ace wrap placed for comfort.  Recommended RICE.  Recommended using Tylenol and Motrin for pain.  Recommended that if the patient develop any numbness, tingling, acutely worsening pain, swelling or any other concerns or complaints they go to the emergency department.  Recommended that if patient has persistent pain they make an appointment to follow-up with the orthopedic specialist.  Otherwise recommended follow-up with primary care doctor.    ED precautions discussed.  Patient (guardian) advised to follow up with PCP in 2-3 days.  Patient (guardian) agrees with this plan of care.  Patient (guardian) verbalizes understanding of discharge instructions and plan of care.      Amount and/or Complexity of Data Reviewed  Radiology: ordered and independent interpretation performed. Decision-making details documented in ED Course.    Risk  OTC drugs.        Disposition and Plan     Clinical Impression:  1. Sprain of left knee, unspecified ligament, initial encounter    2. Knee pain    3. Abrasion         Disposition:  Discharge  6/4/2025 12:56 pm    Follow-up:  Andreina Triplett MD  [address]  [place] [address]  [phone]                Medications Prescribed:  Discharge Medication List as of 6/4/2025  1:13 PM

## 2025-06-06 DIAGNOSIS — M23.92 ACUTE INTERNAL DERANGEMENT OF LEFT KNEE: Primary | ICD-10-CM

## 2025-06-08 ENCOUNTER — OFFICE VISIT (OUTPATIENT)
Dept: FAMILY MEDICINE CLINIC | Facility: CLINIC | Age: 25
End: 2025-06-08
Payer: MEDICAID

## 2025-06-08 ENCOUNTER — TELEPHONE (OUTPATIENT)
Dept: FAMILY MEDICINE CLINIC | Facility: CLINIC | Age: 25
End: 2025-06-08

## 2025-06-08 VITALS
DIASTOLIC BLOOD PRESSURE: 83 MMHG | SYSTOLIC BLOOD PRESSURE: 130 MMHG | WEIGHT: 245.63 LBS | BODY MASS INDEX: 41.94 KG/M2 | HEIGHT: 64.2 IN | HEART RATE: 94 BPM

## 2025-06-08 DIAGNOSIS — S83.92XD SPRAIN OF LEFT KNEE, UNSPECIFIED LIGAMENT, SUBSEQUENT ENCOUNTER: ICD-10-CM

## 2025-06-08 DIAGNOSIS — S80.212D ABRASION OF LEFT KNEE, SUBSEQUENT ENCOUNTER: Primary | ICD-10-CM

## 2025-06-08 PROCEDURE — 99213 OFFICE O/P EST LOW 20 MIN: CPT | Performed by: NURSE PRACTITIONER

## 2025-06-08 NOTE — PROGRESS NOTES
HPI    Patient presents for UC follow up.  Feel in [place] on 6/2.  Seen on 6/4.  Had an xray completed which was normal.  Was referred to ortho.  Seen and assessed on 6/6.  Had an MRI ordered which is scheduled for 6/25.      Would like to ensure that there is no infection to abrasion on knee.    Review of Systems   Musculoskeletal:         Left knee pain, swelling, abrasion.   All other systems reviewed and are negative.       Vitals:    06/08/25 1350   BP: 130/83   Pulse: 94   Weight: 245 lb 9.6 oz (111.4 kg)   Height: 5' 4.2\" (1.631 m)     Body mass index is 41.89 kg/m².    Health Maintenance   Topic Date Due    HPV Vaccines (1 - 3-dose series) Never done    Chlamydia Screening  Never done    Meningococcal B Vaccine (1 of 2 - Standard) Never done    Pap Smear  Never done    DTaP,Tdap,and Td Vaccines (6 - Td or Tdap) 08/19/2024    COVID-19 Vaccine (3 - 2024-25 season) 09/01/2024    Annual Depression Screening  01/01/2025    Annual Physical  01/18/2025    Influenza Vaccine (Season Ended) 10/01/2025    Pneumococcal Vaccine: Birth to 50yrs  Aged Out       Past Medical History[1]    .Past Surgical History[2]    Family History[3]    Social History     Socioeconomic History    Marital status: Single     Spouse name: Not on file    Number of children: Not on file    Years of education: Not on file    Highest education level: Not on file   Occupational History    Not on file   Tobacco Use    Smoking status: Never     Passive exposure: Never    Smokeless tobacco: Never   Vaping Use    Vaping status: Never Used   Substance and Sexual Activity    Alcohol use: Yes     Comment: occ    Drug use: Never    Sexual activity: Never     Birth control/protection: Pill, OCP   Other Topics Concern    Grew up on a farm No    History of tanning Yes    Outdoor occupation No    Breast feeding No    Reaction to local anesthetic No    Pt has a pacemaker No    Pt has a defibrillator No   Social History Narrative    Not on file     Social Drivers  of Health     Food Insecurity: No Food Insecurity (2/11/2025)    Received from [facility]    Food Insecurity     Currently or in the past 3 months, have you worried your food would run out before you had money to buy more?: No     In the past 12 months, have you run out of food or been unable to get more?: No   Transportation Needs: No Transportation Needs (2/11/2025)    Received from [facility]    Transportation Needs     Currently or in the past 3 months, has lack of transportation kept you from medical appointments, getting food or medicine, or providing care to a family member?: No     Non-Medical Transportation Needs?: Not on file     Medical Transportation Needs?: No     Daily Living Transportation Needs? [Peds Only] : Not on file   Stress: Not on file   Housing Stability: Low Risk  (7/3/2023)    Received from [facility]    Housing Stability     Mortgage Payment Concerns?: Not on file     Number of Places Lived in the Last Year: Not on file     Unstable Housing?: Not on file       Current Medications[4]    Allergies:  Allergies[5]    Physical Exam  Vitals and nursing note reviewed.   Constitutional:       Appearance: Normal appearance.   Pulmonary:      Effort: No respiratory distress.   Musculoskeletal:      Left knee: Swelling present. Tenderness present.   Skin:     Findings: Abrasion present.   Neurological:      Mental Status: She is alert and oriented to person, place, and time.          Assessment and Plan:   Problem List Items Addressed This Visit    None  Visit Diagnoses         Abrasion of left knee, subsequent encounter    -  Primary      Sprain of left knee, unspecified ligament, subsequent encounter               Continue rest, ice, compression, elevation.  Follow-up with orthopedics as scheduled and for MRI on 6/25 is scheduled    Discussed plan of care with patient and patient is in agreement.  All questions answered. Patient to call  with questions or concerns.    Encouraged to sign up for My Chart if not already registered.        [1]   Past Medical History:   PCOS (polycystic ovarian syndrome)    Prediabetes   [2] History reviewed. No pertinent surgical history.  [3]   Family History  Problem Relation Age of Onset    Diabetes Mother     Cancer Maternal Grandmother         ovarian   [4]   Current Outpatient Medications   Medication Sig Dispense Refill    Diethylpropion HCl ER 75 MG Oral Tablet 24 Hr Take 1 tablet (75 mg total) by mouth every morning. 30 tablet 3    BLISOVI FE 1/20 1-20 MG-MCG Oral Tab TAKE 1 TABLET BY MOUTH EVERY DAY. SKIP PLACEBO WEEK AND START NEW PACK IMMEDIATELY. TAKE PLACEBO PILLS AFTER THE 3RD CHAI      ketoconazole 2 % External Shampoo Use 2 times weekly to shampoo for psoriasis 120 mL 11    Mometasone Furoate 0.1 % External Solution Use bid to scalp 60 mL 6    ketoconazole 2 % External Cream Apply to affected area 2 times daily. 60 g 2    cetirizine 10 MG Oral Tab Take 1 tablet (10 mg total) by mouth 2 (two) times daily. 60 tablet 5    Tretinoin 0.05 % External Cream Apply a small pea sized amount to areas of acne on the face as directed at bedtime. Use over serums, clindamycin and moistruizers 20 g 6    spironolactone 25 MG Oral Tab Take 1 tablet (25 mg total) by mouth daily. For continuous maintenance 90 tablet 1    Hydrocortisone 2.5 % External Lotion Apply bid to legs 120 mL 5    metFORMIN HCl 1000 MG Oral Tab Take 1 tablet (1,000 mg total) by mouth in the morning and 1 tablet (1,000 mg total) in the evening. Take with meals.      clindamycin 1 % External Lotion Apply 1 Application  topically 2 (two) times daily. Apply thin film to affected area(s). For acne 60 mL 6    ondansetron 4 MG Oral Tablet Dispersible Take 1 tablet (4 mg total) by mouth every 8 (eight) hours as needed for Nausea.      LEVOCETIRIZINE 5 MG Oral Tab TAKE 1 TABLET(5 MG) BY MOUTH EVERY EVENING 90 tablet 1    Meloxicam 15 MG Oral Tab Take 1 tablet  (15 mg total) by mouth daily. 30 tablet 0    spironolactone 50 MG Oral Tab Take 3 tablets (150 mg total) by mouth daily. For flares of acne- (Patient not taking: Reported on 6/8/2025) 270 tablet 1    sertraline 25 MG Oral Tab Take 1 tablet (25 mg total) by mouth daily. (Patient not taking: Reported on 6/8/2025) 90 tablet 0    spironolactone 100 MG Oral Tab Take 1 tablet (100 mg total) by mouth daily. (Patient not taking: Reported on 6/8/2025) 60 tablet 5    calcipotriene 0.005 % External Cream Apply 1 Application topically 2 (two) times daily. To psoriasis knees and to shoulders (Patient not taking: Reported on 6/8/2025) 60 g 12   [5] No Known Allergies

## 2025-06-13 ENCOUNTER — TELEPHONE (OUTPATIENT)
Dept: CASE MANAGEMENT | Age: 25
End: 2025-06-13

## 2025-06-13 NOTE — TELEPHONE ENCOUNTER
MRI Left Knee        Status: DENIED        Reference number [id number]     A copy of the denial letter is filed under the MEDIA tab, reference for complete details. You may reach out to Angelina at [phone] to discuss decision.    This must be  completed within 7 calendar days from the date of this letter      Please reach out to patient with plan of care.      Thank you      Your doctor told us that there is a concern related to your left knee. An imaging study  was asked for. We cannot approve this request because:  Imaging requires six weeks of provider directed treatment to be completed. This must  have been completed in the past three months without improved symptoms. Contact  (via office visit, phone, email, or messaging) must occur after the treatment is  completed. This has not been met because:  The notes sent to us do not show you have completed a full six weeks of this type of  treatment.  Symptoms must be the same or worse after treatment to support imaging.  The notes sent to us do not show any contact with your provider after you completed  your treatment. This contact is needed to plan your future care.

## 2025-07-24 ENCOUNTER — OFFICE VISIT (OUTPATIENT)
Dept: DERMATOLOGY CLINIC | Facility: CLINIC | Age: 25
End: 2025-07-24

## 2025-07-24 DIAGNOSIS — L70.0 CYSTIC ACNE VULGARIS: Primary | ICD-10-CM

## 2025-07-24 DIAGNOSIS — L21.9 SEBORRHEIC DERMATITIS: ICD-10-CM

## 2025-07-24 DIAGNOSIS — L73.2 HIDRADENITIS SUPPURATIVA: ICD-10-CM

## 2025-07-24 DIAGNOSIS — L91.0 KELOID SCAR: ICD-10-CM

## 2025-07-24 DIAGNOSIS — L70.0 ACNE VULGARIS: ICD-10-CM

## 2025-07-24 DIAGNOSIS — L40.0 PSORIASIS VULGARIS: ICD-10-CM

## 2025-07-24 DIAGNOSIS — E28.2 PCOS (POLYCYSTIC OVARIAN SYNDROME): ICD-10-CM

## 2025-07-24 DIAGNOSIS — Z51.81 MEDICATION MONITORING ENCOUNTER: ICD-10-CM

## 2025-07-24 PROCEDURE — 99214 OFFICE O/P EST MOD 30 MIN: CPT | Performed by: DERMATOLOGY

## 2025-07-27 NOTE — PROGRESS NOTES
Candace Boyd is a 24 year old female.    CC:    Chief Complaint   Patient presents with    Acne     LOV 3/2025.  Pt presents for acne f/u.  Will flare if patient eats a trigger.   RX spironolactone and Tretinoin- has been in the sun lately, no using as frequent.     Psoriasis     Pt presents for scalp psoriasis f/u.  One area has been flaking left scalp.   RX ketoconazole shampoo and Mometasone Solution         HISTORY:    Past Medical History:    PCOS (polycystic ovarian syndrome)    Prediabetes      History reviewed. No pertinent surgical history.   Family History   Problem Relation Age of Onset    Diabetes Mother     Cancer Maternal Grandmother         ovarian      Social History     Socioeconomic History    Marital status: Single   Tobacco Use    Smoking status: Never     Passive exposure: Never    Smokeless tobacco: Never   Vaping Use    Vaping status: Never Used   Substance and Sexual Activity    Alcohol use: Yes     Comment: occ    Drug use: Never    Sexual activity: Never     Birth control/protection: Pill, OCP   Other Topics Concern    Grew up on a farm No    History of tanning Yes    Outdoor occupation No    Breast feeding No    Reaction to local anesthetic No    Pt has a pacemaker No    Pt has a defibrillator No     Social Drivers of Health     Food Insecurity: No Food Insecurity (2/11/2025)    Received from [facility]    Food Insecurity     Currently or in the past 3 months, have you worried your food would run out before you had money to buy more?: No     In the past 12 months, have you run out of food or been unable to get more?: No   Transportation Needs: No Transportation Needs (2/11/2025)    Received from [facility]    Transportation Needs     Currently or in the past 3 months, has lack of transportation kept you from medical appointments, getting food or medicine, or providing care to a family member?: No     Medical Transportation Needs?: No    Received  from [facility]    Housing Stability        Current Outpatient Medications   Medication Sig Dispense Refill    Meloxicam 15 MG Oral Tab Take 1 tablet (15 mg total) by mouth daily. 30 tablet 0    Diethylpropion HCl ER 75 MG Oral Tablet 24 Hr Take 1 tablet (75 mg total) by mouth every morning. 30 tablet 3    BLISOVI FE 1/20 1-20 MG-MCG Oral Tab TAKE 1 TABLET BY MOUTH EVERY DAY. SKIP PLACEBO WEEK AND START NEW PACK IMMEDIATELY. TAKE PLACEBO PILLS AFTER THE 3RD CHAI      ketoconazole 2 % External Shampoo Use 2 times weekly to shampoo for psoriasis 120 mL 11    spironolactone 50 MG Oral Tab Take 3 tablets (150 mg total) by mouth daily. For flares of acne- 270 tablet 1    Mometasone Furoate 0.1 % External Solution Use bid to scalp 60 mL 6    cetirizine 10 MG Oral Tab Take 1 tablet (10 mg total) by mouth 2 (two) times daily. 60 tablet 5    Tretinoin 0.05 % External Cream Apply a small pea sized amount to areas of acne on the face as directed at bedtime. Use over serums, clindamycin and moistruizers 20 g 6    spironolactone 25 MG Oral Tab Take 1 tablet (25 mg total) by mouth daily. For continuous maintenance 90 tablet 1    sertraline 25 MG Oral Tab Take 1 tablet (25 mg total) by mouth daily. 90 tablet 0    metFORMIN HCl 1000 MG Oral Tab Take 1 tablet (1,000 mg total) by mouth in the morning and 1 tablet (1,000 mg total) in the evening. Take with meals.      spironolactone 100 MG Oral Tab Take 1 tablet (100 mg total) by mouth daily. 60 tablet 5    calcipotriene 0.005 % External Cream Apply 1 Application topically 2 (two) times daily. To psoriasis knees and to shoulders 60 g 12    ondansetron 4 MG Oral Tablet Dispersible Take 1 tablet (4 mg total) by mouth every 8 (eight) hours as needed for Nausea.      LEVOCETIRIZINE 5 MG Oral Tab TAKE 1 TABLET(5 MG) BY MOUTH EVERY EVENING 90 tablet 1    ketoconazole 2 % External Cream Apply to affected area 2 times daily. (Patient not taking: Reported on 7/24/2025) 60 g  2    Hydrocortisone 2.5 % External Lotion Apply bid to legs (Patient not taking: Reported on 7/24/2025) 120 mL 5    clindamycin 1 % External Lotion Apply 1 Application  topically 2 (two) times daily. Apply thin film to affected area(s). For acne (Patient not taking: Reported on 7/24/2025) 60 mL 6     Allergies:   Allergies[1]    Past Medical History:    PCOS (polycystic ovarian syndrome)    Prediabetes     History reviewed. No pertinent surgical history.  Social History     Socioeconomic History    Marital status: Single     Spouse name: Not on file    Number of children: Not on file    Years of education: Not on file    Highest education level: Not on file   Occupational History    Not on file   Tobacco Use    Smoking status: Never     Passive exposure: Never    Smokeless tobacco: Never   Vaping Use    Vaping status: Never Used   Substance and Sexual Activity    Alcohol use: Yes     Comment: occ    Drug use: Never    Sexual activity: Never     Birth control/protection: Pill, OCP   Other Topics Concern    Grew up on a farm No    History of tanning Yes    Outdoor occupation No    Breast feeding No    Reaction to local anesthetic No    Pt has a pacemaker No    Pt has a defibrillator No   Social History Narrative    Not on file     Social Drivers of Health     Food Insecurity: No Food Insecurity (2/11/2025)    Received from [facility]    Food Insecurity     Currently or in the past 3 months, have you worried your food would run out before you had money to buy more?: No     In the past 12 months, have you run out of food or been unable to get more?: No   Transportation Needs: No Transportation Needs (2/11/2025)    Received from [facility]    Transportation Needs     Currently or in the past 3 months, has lack of transportation kept you from medical appointments, getting food or medicine, or providing care to a family member?: No     Non-Medical Transportation Needs?: Not on file      Medical Transportation Needs?: No     Daily Living Transportation Needs? [Peds Only] : Not on file   Stress: Not on file   Housing Stability: Low Risk  (7/3/2023)    Received from [facility]    Housing Stability     Mortgage Payment Concerns?: Not on file     Number of Places Lived in the Last Year: Not on file     Unstable Housing?: Not on file     Family History   Problem Relation Age of Onset    Diabetes Mother     Cancer Maternal Grandmother         ovarian       HPI:     HPI:  Chief Complaint   Patient presents with    Acne     LOV 3/2025.  Pt presents for acne f/u.  Will flare if patient eats a trigger.   RX spironolactone and Tretinoin- has been in the sun lately, no using as frequent.     Psoriasis     Pt presents for scalp psoriasis f/u.  One area has been flaking left scalp.   RX ketoconazole shampoo and Mometasone Solution     Follow-up acne, new cystic nodule on left cheek, overall has noted still scars, inflammatory lesions.  Diet has particularly been problematic with triggering flareups especially cheese, dairy and gluten.  Has been using tretinoin and noticed difficulty tolerating this due to dryness in the winter.  Has been taking spironolactone more intermittently for 2 to 3 days at a time with flareups.  Generally has to take this every couple of weeks.  Has noted less stress now will be finishing her associates degree.    Has noted scalp generally doing well with psoriasis under control with ketoconazole shampoo mometasone Zyrtec.    Has been using event balm which has helped with dryness on the face feels acne postinflammatory changes in particular have improved with this.  Continues with her usual skin care regimen.    Otherwise nothing new or different healthwise  Labs from earlier this year reviewed hemoglobin A1c 5.8 renal liver functions potassium normal insulin level elevated at 33.7 platelets elevated previously  DHEA-S mid to upper range in 2023 and testosterone  free and weakly bound elevated    No personal  or family history of skin cancer      History of Present Illness  Candace Boyd is a 24 year old female with acne and psoriasis who presents with worsening chest acne and skin concerns.    She has worsening acne on her chest, characterized by acne scars and a current weinstein. Tretinoin use led to increased irritation, prompting discontinuation. She experiences sweating under her breasts and uses deodorant to manage odor. Her medication regimen, including spironolactone, has been inconsistent, though she attempts daily adherence. Clindamycin lotion is available, and she plans to apply it twice daily on her chest.    She has a history of psoriasis affecting her knees and elbows. She uses a prescribed topical treatment for her knees but does not recall the specific medication. No over-the-counter treatments have been used for her knees. Her scalp psoriasis is stable with the use of prescribed shampoo and topical solutions. No current issues with her scalp.    She mentions keratosis pilaris on her legs, which she manages with salicylic acid pads and dry brushing. She is exploring various lotions for skin hydration and discoloration, including a product from [employer], but has not observed significant changes.    She experiences seasonal allergies, which have been exacerbated by recent weather changes, but she has obtained a refill for her allergy medication.    7/25  Candace Boyd is a 24 year old female with psoriasis who presents for dermatological follow-up.    Her psoriasis is stable with occasional flaking in one spot. She is uncertain if this is due to inadequate shampooing or reduced hair washing during the summer. She uses ketoconazole shampoo and mometasone serum once daily but is running low on the serum and requires a refill. She washed her hair last night.    She experiences acne breakouts, particularly whiteheads, when consuming dairy products or  eggs. She avoids these foods as much as possible but occasionally consumes desserts containing dairy. Eggs baked into foods do not cause breakouts. She is currently taking spironolactone and has refills available.    She recently fell in [place], resulting in a knee abrasion. She received care at an immediate care facility and has been applying scar gel to the area. The abrasion is healing, and she is cautious about shaving the area until it is fully healed.    Her stepmother observed that her keloids appear improved. She attributes some improvement to a sunburn she received in [place], which caused peeling and may have exfoliated the keloids. She wore sunscreen during this time.    She is transferring to [employer] and will start in the fall. 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 She plans to commute to campus via the [employer] and walk from [employer]. She reports sensitive eyes due to bright light exposure.      Patient presents with concerns above.    Patient has been in their usual state of health.     Past notes/ records and appropriate/relevant lab results including pathology and past body maps reviewed. Including outside notes/ PCP notes as appropriate. Updated and new information noted in current visit.     ROS:  Denies other relevant systemic complaints. See HPI.     History, medications, allergies reviewed as noted.    Allergies:  Patient has no known allergies.      There were no vitals filed for this visit.    Physical Examination:     Well-developed well-nourished patient alert oriented in no acute distress.  Exam performed of appropriate involved areas    See map today's date for lesions noted .  See assessment and plan below for specific lesions.    Otherwise remarkable for lesions as noted on map.    See A/P  below for additional information:    Assessment / plan:    No orders of the defined types were placed in this encounter.      Meds & Refills for this  Visit:  Requested Prescriptions      No prescriptions requested or ordered in this encounter         Encounter Diagnoses   Name Primary?    Cystic acne vulgaris Yes    Acne vulgaris     Keloid scar     Psoriasis vulgaris     Medication monitoring encounter     PCOS (polycystic ovarian syndrome)     Hidradenitis suppurativa     Seborrheic dermatitis              Physical Exam      Results      Assessment & Plan  Folliculitis  Less likely at bedtime lesions on chest  Folliculitis on the chest, likely due to friction and sweating, exacerbated by tretinoin-induced irritation. Acne scars and a current weinstein are present. Antiperspirants and deodorants may contribute to irritation. Prescription antiperspirants like Drysol or Hypercare are too irritating, and tretinoin is not recommended.  - Apply clindamycin lotion twice daily to reduce inflammation and bacterial load.  - Avoid tretinoin on the chest due to irritation.  - Consider salicylic acid moisturizers for acne marks.  - Avoid prescription antiperspirants like Drysol or Hypercare.    Psoriasis  Psoriasis on the knees and elbows. She has a topical treatment for the knees but does not recall the medication. No over-the-counter treatments are used for the knees.  - Provide samples of topical treatments for psoriasis on the knees and elbows.  - Continue current topical treatment for the knees if effective.    Keratosis Pilaris  Keratosis Pilaris on the legs. She has tried dry brushing and salicylic acid pads. Preparing for a wedding and summer, she seeks improved skin texture. Salicylic acid pads may be too drying; lactic acid products like Lac-Hydrin are suggested for larger areas.  - Consider salicylic acid lotion for KP, cautioning against over-drying.  - Explore lactic acid products like Lac-Hydrin for larger areas.    Seborrheic Dermatitis  Seborrheic dermatitis on the scalp is well-managed with ketoconazole shampoo and topical solution.  - Continue ketoconazole  shampoo and topical solution.    Allergic Rhinitis  Allergies exacerbated by weather changes. She has obtained a refill for allergy medication.  - Continue allergy medication as needed.    Recording duration: 7 minutes      Cystic acne vulgaris  Intermittent whiteheads, particularly after consuming dairy or eggs. She is aware of dietary triggers and is managing them by avoiding certain foods. Current treatment with spironolactone is effective.  - Continue spironolactone.  - Monitor and adjust diet based on triggers.    Psoriasis vulgaris  Mild flaking on the scalp, possibly due to infrequent shampooing or seasonal changes. Current treatment includes ketoconazole shampoo and mometasone furoate solution. Recent flare-ups require increased mometasone use.  - Use mometasone furoate solution twice daily on the scalp.  - Continue ketoconazole shampoo as prescribed.    Keloids  Keloids appear improved, possibly due to recent sun exposure and peeling. Sunburn in [place] may have contributed to improvement.  - Monitor keloids for changes.    Skin abrasion  Recent fall in [place] resulted in a knee abrasion. Wound is healing well with scar gel. She is cautious about shaving the area until fully healed.  - Continue applying scar gel.  - Monitor for signs of psoriasis development at the abrasion site.    Recording duration: 9 minutes      Acne. See medications in grid.  Skin care regimen discussed at length including cleansers, makeup, face washing, sunscreen.  Recheck in 6 -8 weeks if no improvement.  Notify us promptly if problems tolerating regimen.  Consider more aggressive therapy if not responding.  Insetting of insulin resistance, PCOS  Encouraged more consistent spironolactone 25 mg daily may increase as needed with flare continue topicals tretinoin has been helpful continue.  Using a fan balm which has been helping.  Continue monitoring.  Continue working on control PCOS and insulin levels.       Psoriasis.   Plaque-type.  10% body surface involvement.  Will treat with medications and grid.  Overall skincare, liberal use of emollients discussed.  Consider more aggressive therapy if worsening.Patient will let us know how they are doing over the next several weeks.  Await clinical response to above therapy.  Recheck in 2-3 months if no improvement.  Overall stable continue topicals  Continue ketoconazole shampoo, topical steroids.  She does have clobetasol cream at home may continue with this.  Notes some scaling on hand and on feet ketoconazole to feet, and webspaces.  Use clobetasol to itchy areas on feet and hands  Continue cetirizine for dermographism    Benign-appearing nevi, no atypical features on dermoscopy reassurance given monitor.        No other susupicious lesions on todays  exam.    Please refer to map for specific lesions.  See additional diagnoses.  Pros cons of various therapies, risks benefits discussed.Pathophysiology, terapeutic options reviewed.  See  Medications in grid.  Instructions reviewed at length.    Reassurance regarding other benign skin lesions.    Benign nevi, seborrheic  keratoses, cherry angiomas:  Reassurance regarding other benign skin lesions.    Monitor for new or changing lesions. Signs and symptoms of skin cancer, ABCDE's of melanoma ( additional information available at AAD.org, skincancer.org) Encourage Sunscreen (broad-spectrum, ideally mineral-based-UVA/UVB -SPF 30 or higher) use encouraged, sun protection/sun protective clothing, self exams reviewed Followup as noted RTC ---routine checkup 6 mos -one year or p.r.n.    Encounter Times   Including precharting, reviewing chart, prior notes obtaining history, medical exam, notes on body map, plan, counseling, discussion of therapeutic options, patient education, orders more than half total time spent in face to face time with patient.  My total time spent caring for the patient on the day of the encounter: 35 minutes       The  patient indicates understanding of these issues and agrees to the plan.  The patient is asked to return as noted in follow-up/ above.    This note was generated using Dragon voice recognition software.  Please contact me regarding any confusion resulting from errors in recognition..  Note to patient and family: The 21st Century Cures Act makes medical notes like these available to patients. However, be advised this is a medical document. It is intended as [id number] communication and monitoring of a patient's care needs. It is written in medical language and may contain abbreviations or verbiage that are unfamiliar. It may appear blunt or direct. Medical documents are intended to carry relevant information, facts as evident and the clinical opinion of the practitioner.           [1] No Known Allergies

## (undated) NOTE — LETTER
10/2/2023          To Whom It May Concern:  Dileep Tellez is currently under my medical care. She has a diagnosis of prediabetes and binge eating disorder. This disability impacts her in the test setting because she has to holliday nausea, thirst and hunger while testing. Please accommodate her during test times so that she is able to have water and small snacks on hand to ease symptoms of her conditions/ possible medication side effects. This will allow her to take exams feeling better symptomatically and reduce the distractions during testing. Please allow her to test in a quiet /separate room. She has inattention and is currently being worked up for ADD/ADHD. Sincerely,      Willie Patel MD          Document generated by:   Willie Patel MD

## (undated) NOTE — ED AVS SNAPSHOT
[facility] in [facility] [address]  [address]\""    Phone:  [phone]    Fax:  [facility]   MRN: [id number]    Department:  [facility] in [address] [place] [address]   Date of Visit: URI, VIRAL, NO ABX (ADULT) (ENGLISH)      Disclosure     Insurance plans vary and the physician(s) referred by the Immediate Care may not be covered by your plan. It is possible that the physician may not participate in your health insurance plan.   This IF THERE IS ANY CHANGE OR WORSENING OF YOUR CONDITION, CALL YOUR PRIMARY CARE PHYSICIAN AT ONCE OR GO TO THE EMERGENCY DEPARTMENT.     If you have been prescribed any medication(s), please fill your prescription right away and begin taking the medication(s) harming yourself, contact [facility] at [phone]. - If you don’t have insurance, [facility] has partnered with [facility] [address] to help you get signed up for insurance coverage.   [facility]

## (undated) NOTE — LETTER
10/2/2023          To Whom It May Concern:    Maribell Rojas is currently under my medical care. She has a diagnosis of prediabetes and binge eating disorder. This disability impacts her in the test setting because she has to holliday nausea, thirst and hunger while testing. Please accommodate her during test times so that she is able to have water and small snacks on hand to ease symptoms of her conditions/ possible medication side effects. This will allow her to take exams feeling better symptomatically and reduce the distractions during testing. Please allow her to test in a quiet /separate room. She has inattention and is currently being worked up for ADD/ADHD. If you require additional information please contact our office. Sincerely,      Kerry Pagan MD          Document generated by:   Kerry Pagan MD

## (undated) NOTE — LETTER
1/5/2024          To Whom It May Concern:    Candace Boyd is currently under my medical care and may not return to {em school/work:893858416} at this time.    Please excuse Candace for {NUMBERS 0-10:3282} {days weeks:3323::\"days\"}.  {HE/SHE :6250} may return to {em school/work:881036819} on ***.  Activity is restricted as follows: {EM SCHOOL/WORK RESTRICTIONS:258260827}.    If you require additional information please contact our office.        Sincerely,    Chayo Hayden MD          Document generated by:  Chayo Hayden MD